# Patient Record
Sex: MALE | Race: WHITE | NOT HISPANIC OR LATINO | Employment: UNEMPLOYED | URBAN - METROPOLITAN AREA
[De-identification: names, ages, dates, MRNs, and addresses within clinical notes are randomized per-mention and may not be internally consistent; named-entity substitution may affect disease eponyms.]

---

## 2019-05-15 ENCOUNTER — TELEPHONE (OUTPATIENT)
Dept: VASCULAR SURGERY | Facility: CLINIC | Age: 49
End: 2019-05-15

## 2019-05-15 DIAGNOSIS — I87.2 VENOUS INSUFFICIENCY OF BOTH LOWER EXTREMITIES: Primary | ICD-10-CM

## 2019-05-15 NOTE — TELEPHONE ENCOUNTER
Per Dr. Sae daniels - pt. Scheduled for U/S Venous Insuff and apt with Dr. Quevedo. Information provide to patient. States understanding.

## 2019-05-21 ENCOUNTER — HOSPITAL ENCOUNTER (OUTPATIENT)
Dept: RADIOLOGY | Facility: OTHER | Age: 49
Discharge: HOME | End: 2019-05-21
Attending: SURGERY
Payer: MEDICAID

## 2019-05-21 DIAGNOSIS — I87.2 VENOUS INSUFFICIENCY OF BOTH LOWER EXTREMITIES: ICD-10-CM

## 2019-05-21 PROCEDURE — 93970 EXTREMITY STUDY: CPT | Mod: TC

## 2019-05-21 PROCEDURE — 93970 EXTREMITY STUDY: CPT | Mod: 26,,, | Performed by: INTERNAL MEDICINE

## 2019-05-21 PROCEDURE — 93970 US LOWER EXTREMITY VEINS BILATERAL INSUFFICIENCY: ICD-10-PCS | Mod: 26,,, | Performed by: INTERNAL MEDICINE

## 2019-05-24 ENCOUNTER — OFFICE VISIT (OUTPATIENT)
Dept: VASCULAR SURGERY | Facility: CLINIC | Age: 49
End: 2019-05-24
Payer: MEDICAID

## 2019-05-24 VITALS
HEIGHT: 71 IN | DIASTOLIC BLOOD PRESSURE: 77 MMHG | TEMPERATURE: 98 F | BODY MASS INDEX: 33.15 KG/M2 | WEIGHT: 236.75 LBS | SYSTOLIC BLOOD PRESSURE: 116 MMHG | HEART RATE: 92 BPM

## 2019-05-24 DIAGNOSIS — I87.2 VENOUS INSUFFICIENCY: Primary | ICD-10-CM

## 2019-05-24 PROCEDURE — 99204 OFFICE O/P NEW MOD 45 MIN: CPT | Mod: S$GLB,,, | Performed by: SURGERY

## 2019-05-24 PROCEDURE — 99204 PR OFFICE/OUTPT VISIT, NEW, LEVL IV, 45-59 MIN: ICD-10-PCS | Mod: S$GLB,,, | Performed by: SURGERY

## 2019-05-24 RX ORDER — NAPROXEN SODIUM 220 MG/1
81 TABLET, FILM COATED ORAL
Refills: 11 | COMMUNITY
Start: 2019-05-14

## 2019-05-24 RX ORDER — SELENIUM SULFIDE 22.5 MG/ML
SHAMPOO TOPICAL
Refills: 5 | COMMUNITY
Start: 2019-05-14 | End: 2024-02-15

## 2019-05-24 RX ORDER — CROFELEMER 125 MG/1
125 TABLET, COATED ORAL
Refills: 5 | COMMUNITY
Start: 2019-05-14

## 2019-05-24 RX ORDER — CLONAZEPAM 1 MG/1
1 TABLET ORAL
COMMUNITY
End: 2023-04-25

## 2019-05-24 NOTE — PROGRESS NOTES
Tolu Quevedo MD, RPVI                                 [facility]                           [facility]                             05/24/2019    HPI:  David Fam is a 49 y.o. male with   Patient Active Problem List   Diagnosis    HIV, symptomatic    Essential hypertension    Recurrent genital herpes    Cystic acne    Depression    being managed by PCP and specialists who is here today for evaluation of BLE edema.  Patient states location is bilateral calf and ankles occurring for 2 months.  Associated signs and symptoms include erythema and itching.  No pain.  Severity is 3/10.  Symptoms began 2 mo ago.  Alleviating factors include elevation.  Worsening factors include dependency.  Patient is not wearing compression stockings daily.  No FH of venous disease.  Symptoms do not limit patient's functional status and daily activities.  No DVT history.  No venous interventions.  + low sodium diet.  + excessive water intake.    Migraine with aura: no  PFO/ASD/right to left shunt: no  Pregnant: no  Breastfeeding: no    no MI  no Stroke  Tobacco use: 1 ppd     Past Medical History:   Diagnosis Date    Anal fissure     HIV infection     Hypertension      Past Surgical History:   Procedure Laterality Date    fissue       Family History   Problem Relation Age of Onset    Cancer Mother         breast ca    Hypertension Mother     Cancer Father         terminal cancer     Social History     Socioeconomic History    Marital status: Single     Spouse name: Not on file    Number of children: Not on file    Years of education: 12    Highest education level: Not on file   Occupational History    Not on file   Social Needs    Financial resource strain: Not on file    Food insecurity:     Worry: Not on file     Inability: Not on file    Transportation needs:     Medical: Not on file     Non-medical: Not on file   Tobacco Use    Smoking status: Current Every Day Smoker      Types: Pipe    Smokeless tobacco: Never Used   Substance and Sexual Activity    Alcohol use: Yes     Alcohol/week: 0.0 oz    Drug use: No    Sexual activity: Yes     Partners: Male   Lifestyle    Physical activity:     Days per week: Not on file     Minutes per session: Not on file    Stress: Not on file   Relationships    Social connections:     Talks on phone: Not on file     Gets together: Not on file     Attends Jewish service: Not on file     Active member of club or organization: Not on file     Attends meetings of clubs or organizations: Not on file     Relationship status: Not on file   Other Topics Concern    Not on file   Social History Narrative    Not on file       Current Outpatient Medications:     elviteg-cob-emtri-tenof ALAFEN (GENVOYA) [phone] mg Tab, , Disp: , Rfl:     loperamide (IMODIUM) 2 mg capsule, Take 1 capsule (2 mg total) by mouth 4 (four) times daily as needed for Diarrhea., Disp: 50 capsule, Rfl: 3    losartan-hydrochlorothiazide 100-12.5 mg (HYZAAR) 100-12.5 mg Tab, Take 1 tablet by mouth daily as needed., Disp: 30 tablet, Rfl: 5    acyclovir (ZOVIRAX) 400 MG tablet, Take 1 tablet (400 mg total) by mouth 2 (two) times daily., Disp: 60 tablet, Rfl: 5    aripiprazole (ABILIFY) 15 MG Tab, , Disp: , Rfl:     aspirin 81 MG Chew, 81 mg., Disp: , Rfl: 11    benzoyl peroxide (BREVOXYL) 4 % external liquid, Apply topically nightly. To chest and back, Disp: 1 Bottle, Rfl: 2    clonazePAM (KLONOPIN) 1 MG tablet, Take 1 mg by mouth., Disp: , Rfl:     desonide (DESOWEN) 0.05 % cream, Apply topically 2 (two) times daily. AAA BID x 2 weeks, then prn need only. Ok to mix with ketoconazole, Disp: 60 g, Rfl: 0    doxycycline (MONODOX) 100 MG capsule, Sig BID for two weeks, then once daily, Disp: 90 capsule, Rfl: 1    efavirenz-emtrictabine-tenofovir 600-200-300 mg (ATRIPLA) 600-200-300 mg Tab, Take 1 tablet by mouth every evening., Disp: 30 tablet, Rfl: 5    hydrOXYzine HCl  (ATARAX) 25 MG tablet, Take 25 mg by mouth 3 (three) times daily., Disp: , Rfl:     ketoconazole (NIZORAL) 2 % cream, Apply topically 2 (two) times daily. To rash on face BID in brow area, forehead, folds by nose. Ok to mix with desonide., Disp: 60 g, Rfl: 1    ketoconazole (NIZORAL) 2 % shampoo, Apply topically twice a week. Leave on five minutes before rinsing shampoo, Disp: 120 mL, Rfl: 2    multivitamin capsule, Take 1 capsule by mouth once daily., Disp: , Rfl:     mupirocin (BACTROBAN) 2 % ointment, Apply topically 3 (three) times daily., Disp: 30 g, Rfl: 5    MYTESI 125 mg TbEC, 125 mg., Disp: , Rfl: 5    nicotine (NICOTROL) 10 mg Crtg, Also dispense one dispenser, inhaler to use with crtg. Use once every hour prn, Disp: 168 each, Rfl: 4    olanzapine (ZYPREXA) 5 MG tablet, , Disp: , Rfl:     risperidone (RISPERDAL) 1 MG tablet, Take 1 mg by mouth 2 (two) times daily., Disp: , Rfl:     selenium sulfide 2.25 % Sham, , Disp: , Rfl: 5    sulfacetamide sodium-sulfur 10-5 % (w/w) Clsr, Wash face at least TIW, Disp: 360 g, Rfl: 3    REVIEW OF SYSTEMS:  General: No fevers or chills; ENT: No sore throat; Allergy and Immunology: no persistent infections; Hematological and Lymphatic: No history of bleeding or easy bruising; Endocrine: negative; Respiratory: no cough, shortness of breath, or wheezing; Cardiovascular: no chest pain or dyspnea on exertion; Gastrointestinal: no abdominal pain/back, change in bowel habits, or bloody stools; Genito-Urinary: no dysuria, trouble voiding, or hematuria; Musculoskeletal: edema; Neurological: no TIA or stroke symptoms; Psychiatric: no nervousness, anxiety or depression.    PHYSICAL EXAM:      Pulse: 92  Temp: 98.1 °F (36.7 °C)      General appearance:  Alert, well-appearing, and in no distress.  Oriented to person, place, and time                    Neurological: Normal speech, no focal findings noted; CN II - XII grossly intact. RLE with sensation to light touch, LLE  with sensation to light touch.            Musculoskeletal: Digits/nail without cyanosis/clubbing.  Strength 5/5 BLE.                    Neck: Supple, no significant adenopathy                  Chest:  No wheezes, symmetric air entry. No use of accessory muscles               Cardiac: Normal rate and regular rhythm            Abdomen: Soft, nontender, nondistended      Extremities:   2+ R DP pulse, 2+ L DP pulse      1+ RLE edema, 1+ LLE edema    Skin:  RLE no ulcer; LLE no ulcer      RLE no spider veins, LLE no spider veins      RLE no varicose veins, LLE no varicose veins    CEAP 3/3    LAB RESULTS:  No results found for: CBC  No results found for: LABPROT, INR  Lab Results   Component Value Date     05/05/2016    K 4.2 05/05/2016     05/05/2016    CO2 26 05/05/2016     05/05/2016    BUN 25 (H) 05/05/2016    CREATININE 0.9 05/05/2016    CALCIUM 9.5 05/05/2016    ANIONGAP 9 05/05/2016    EGFRNONAA >60.0 05/05/2016     Lab Results   Component Value Date    WBC 8.50 05/05/2016    RBC 5.20 05/05/2016    HGB 17.3 05/05/2016    HCT 50.9 05/05/2016    MCV 98 05/05/2016    MCH 33.3 (H) 05/05/2016    MCHC 34.1 05/05/2016    RDW 14.2 05/05/2016     (L) 05/05/2016    MPV 9.5 05/05/2016    GRAN 6.4 05/05/2016    GRAN 74.5 (H) 05/05/2016    LYMPH 1.6 05/05/2016    LYMPH 18.5 05/05/2016    MONO 0.5 05/05/2016    MONO 5.6 05/05/2016    EOS 0.1 05/05/2016    BASO 0.00 05/05/2016    EOSINOPHIL 1.0 05/05/2016    BASOPHIL 0.4 05/05/2016    DIFFMETHOD Automated 05/05/2016     .No results found for: HGBA1C    IMAGING:  All pertinent imaging has been reviewed and interpreted independently.    Venous US 5/15/19 Impression:  No evidence of venous thrombosis.  The left greater saphenous vein at the mid calf demonstrates a reflux time of 3032 milliseconds which is elevated and compatible with venous insufficiency.  The vessel at this level is not enlarged, measuring 3.3 mm.    IMP/PLAN:  49 y.o. male with   Patient  Active Problem List   Diagnosis    HIV, symptomatic    Essential hypertension    Recurrent genital herpes    Cystic acne    Depression    being managed by PCP and specialists who is here today for evaluation of BLE edema.    -recommend compression with Rx stockings, elevation, dietary changes associated with water and sodium intake discussed at length with patient  -Exercise   -RTC 3 months for further evaluation    I spent 20 minutes evaluating this patient and greater than 50% of the time was spent counseling, coordinator care and discussing the plan of care.  All questions were answered and patient stated understanding with agreement with the above treatment plan.    Tolu Quevedo MD VI  Vascular and Endovascular Surgery

## 2019-05-24 NOTE — LETTER
May 27, 2019      Brittney Orantes, NP  [id number]  [facility] [address]           [facility]  [address]  [facility] [address]  Phone: [phone]  Fax: [phone]          Patient: David Fam   MR Number: [id number]   YOB: 1970   Date of Visit: 5/24/2019       Dear Brittney Orantes:    Thank you for referring David Fam to me for evaluation. Attached you will find relevant portions of my assessment and plan of care.    If you have questions, please do not hesitate to call me. I look forward to following David Fam along with you.    Sincerely,    Tolu Quevedo MD    Enclosure  CC:  No Recipients    If you would like to receive this communication electronically, please contact [email or web address] or [phone] to request more information on [employer] Link access.    For providers and/or their staff who would like to refer a patient to [facility], please contact us through our one-stop-shop provider referral line, [facility] , at [phone].    If you feel you have received this communication in error or would no longer like to receive these types of communications, please e-mail [email or web address]

## 2019-05-24 NOTE — PATIENT INSTRUCTIONS
Putting on Compression Stockings     Turn the stocking inside-out, then fit it over your toes and heel.          Roll the stocking up your leg.            Once stockings are on, make sure the top of the stocking is about two fingers width below the crease of the knee (or the groin if you wear thigh-high stockings).          Use equipment, such as a stocking [place], or wear rubber gloves to make it easier to put on compression stockings.         Elastic compression stockings are prescribed to treat many vein problems. Wearing them may be the most important thing you do to manage your symptoms. The stockings fit tightly around your ankle, gradually reducing in pressure as they go up your legs. This helps keep blood flowing to your heart. As a result, swelling is reduced. Your healthcare provider will prescribe stockings at a safe pressure for you. He or she will also tell you how often to wear and remove the stockings. Follow these instructions closely. Also, do not buy or wear compression stockings without first seeing your healthcare provider.  Tips for wear and care  To wear stockings safely and to get the most benefit:  · Wear the length prescribed by your healthcare provider.  · Pull them to the designated height and no farther. Dont let them bunch at the top. This can restrict blood flow and increase swelling.  · Wear the stockings for the amount of time your healthcare provider recommends. Replace them when they start to feel loose. This will likely be every 3 to 6 months.  · Remove them as your healthcare provider directs. When removed, wash your legs. Then check your legs and feet for sores. Call your healthcare provider if you find a sore. Dont put the stockings back on unless your healthcare provider directs.   · Wash the stockings as instructed. They may need to be hand-washed.  Date Last Reviewed: 5/1/2016  © 0798-7598 [employer]. [address] [place] [address], [place] [address]. All rights  reserved. This information is not intended as a substitute for professional medical care. Always follow your healthcare professional's instructions.        Understanding Chronic Venous Insufficiency  Problems with the veins in the legs may lead to chronic venous insufficiency (CVI). CVI means that there is a long-term problem with the veins not being able to pump blood back to your heart. When this happens, blood stays in the legs and causes swelling and aching.   Two problems that may lead to chronic venous insufficiency are:  · Damaged valves. Valves keep blood flowing from the legs through the blood vessels and back to the heart. When the valves are damaged, blood does not flow as well.   · Deep vein thrombosis (DVT). Blood clots may form in the deep veins of the legs. This may cause pain, redness, and swelling in the legs. It may also block the flow of blood back to the heart. Seek immediate medical care if you have these symptoms.  · A blood clot in the leg can also break off and travel to the lungs. This is called pulmonary embolism (PE). In the lungs, the clot can cut off the flow of blood. This may cause chest pain, trouble breathing, sweating, a fast heartbeat, coughing (may cough up blood), and fainting. It is a medical emergency and may cause death. Call 911 if you have these symptoms.  · Healthcare providers call the two conditions, DVT and PE, venous thromboembolism (VTE).  CVI cant be cured, but you can control leg swelling to reduce the likelihood of ulcers (sores).  Recognizing the symptoms  Be aware of the following:  · If you stand or sit with your feet down for long periods, your legs may ache or feel heavy.  · Swollen ankles are possibly the most common symptom of CVI.  · As swelling increases, the skin over your ankles may show red spots or a brownish tinge. The skin may feel leathery or scaly, and may start to itch.  · If swelling is not controlled, an ulcer (open wound) may form.  What you  can do  Reduce your risk of developing ulcers by doing the following:  · Increase blood flow back to your heart by elevating your legs, exercising daily, and wearing elastic stockings.  · Boost blood flow in your legs by losing excess weight.  · If you must stand or sit in one place for a period of time, keep your blood moving by wiggling your toes, shifting your body position, and rising up on the balls of your feet.    Date Last Reviewed: 5/1/2016 © 2000-2017 [employer]. [address] [place] [address], [place] [address]. All rights reserved. This information is not intended as a substitute for professional medical care. Always follow your healthcare professional's instructions.        Tips for Using Less Salt    Most people with heart problems need to eat less salt (sodium). Reducing the amount of salt you eat may help control your blood pressure. The higher your blood pressure, the greater your risk for heart disease, stroke, blindness, and kidney problems.  At the store  · Make low-salt choices by reading labels carefully. Look for the total amount of sodium per serving.  · Use more fresh food. Buy more fruits and vegetables. Select lean meats, fish, and poultry.  · Use fewer frozen, canned, and packaged foods which often contain a lot of sodium.  · Use plain frozen vegetables without sauces or toppings. These products are often low- or no-sodium.  · Opt for reduced-sodium or no-salt-added versions of canned vegetables and soups.  In the kitchen  · Don't add salt to food when you're cooking. Season with flavorings such as onion, garlic, pepper, salt-free herbal blends, and lemon or lime juice.  · Use a cookbook containing low-salt recipes. It can give you ideas for tasty meals that are healthy for your heart.  · Sprinkle salt-free herbal blends on vegetables and meat.  · Drain and rinse canned foods, such as canned beans and vegetables, before cooking or eating.  Eating out  · Tell the  you're on a  low-salt diet. Ask questions about the menu.  · Order fish, chicken, and meat broiled, baked, poached, or grilled without salt, butter, or breading.  · Use lemon, pepper, and salt-free herb mixes to add flavor.  · Choose plain steamed rice, boiled noodles, and baked or boiled potatoes. Top potatoes with chives and a little sour cream.     Beware! Salt goes by many other names. Limit foods with these words listed as ingredients: salt, sodium, soy sauce, baking soda, baking powder, MSG, monosodium, Na (the chemical symbol for sodium). Some antacids are also high in salt.   Date Last Reviewed: 6/19/2015 © 2000-2017 [employer]. [address] [place] [address]. All rights reserved. This information is not intended as a substitute for professional medical care. Always follow your healthcare professional's instructions.        Low-Salt Diet  This diet removes foods that are high in salt. It also limits the amount of salt you use when cooking. It is most often used for people with high blood pressure, edema (fluid retention), and kidney, liver, or heart disease.  Table salt contains the mineral sodium. Your body needs sodium to work normally. But too much sodium can make your health problems worse. Your healthcare provider is recommending a low-salt (also called low-sodium) diet for you. Your total daily allowance of salt is 1,500 to 2,300 milligrams (mg). It is less than 1 teaspoon of table salt. This means you can have only about 500 to 700 mg of sodium at each meal. People with certain health problems should limit salt intake to the lower end of the recommended range.    When you cook, dont add much salt. If you can cook without using salt, even better. Dont add salt to your food at the table.  When shopping, read food labels. Salt is often called sodium on the label. Choose foods that are salt-free, low salt, or very low salt. Note that foods with reduced salt may not lower your salt intake  enough.    Beans, potatoes, and pasta  Ok: Dry beans, split peas, lentils, potatoes, rice, macaroni, pasta, spaghetti without added salt  Avoid: Potato chips, tortilla chips, and similar products  Breads and cereals  Ok: Low-sodium breads, rolls, cereals, and cakes; low-salt crackers, matzo crackers  Avoid: Salted crackers, pretzels, popcorn, Lao toast, pancakes, muffins  Dairy  Ok: Milk, chocolate milk, hot chocolate mix, low-salt cheeses, and yogurt  Avoid: Processed cheese and cheese spreads; Roquefort, Camembert, and cottage cheese; buttermilk, instant breakfast drink  Desserts  Ok: Ice cream, frozen yogurt, juice bars, gelatin, cookies and pies, sugar, honey, jelly, hard candy  Avoid: Most pies, cakes and cookies prepared or processed with salt; instant pudding  Drinks  Ok: Tea, coffee, fizzy (carbonated) drinks, juices  Avoid: Flavored coffees, electrolyte replacement drinks, sports drinks  Meats  Ok: All fresh meat, fish, poultry, low-salt tuna, eggs, egg substitute  Avoid: Smoked, pickled, brine-cured, or salted meats and fish. This includes germain, chipped beef, corned beef, hot dogs, deli meats, ham, kosher meats, salt pork, sausage, canned tuna, salted codfish, smoked salmon, herring, sardines, or anchovies.  Seasonings and spices  Ok: Most seasonings are okay. Good substitutes for salt include: fresh herb blends, hot sauce, lemon, garlic, lam, vinegar, dry mustard, parsley, cilantro, horseradish, tomato paste, regular margarine, mayonnaise, unsalted butter, cream cheese, vegetable oil, cream, low-salt salad dressing and gravy.  Avoid: Regular ketchup, relishes, pickles, soy sauce, teriyaki sauce, Worcestershire sauce, BBQ sauce, tartar sauce, meat tenderizer, chili sauce, regular gravy, regular salad dressing, salted butter  Soups  Ok: Low-salt soups and broths made with allowed foods  Avoid: Bouillon cubes, soups with smoked or salted meats, regular soup and broth  Vegetables  Ok: Most vegetables  are okay; also low-salt tomato and vegetable juices  Avoid: Sauerkraut and other brine-soaked vegetables; pickles and other pickled vegetables; tomato juice, olives  Date Last Reviewed: 8/1/2016 © 2000-2017 [employer]. [address] [place] [address]. All rights reserved. This information is not intended as a substitute for professional medical care. Always follow your healthcare professional's instructions.        Low-Salt Choices  Eating salt (sodium) can make your body retain too much water. Excess water makes your heart work harder. Canned, packaged, and frozen foods are easy to prepare, but they are often high in sodium. Here are some ideas for low-salt foods you can easily prepare yourself.    For breakfast  · Fruit or 100% fruit juice  · Whole-wheat bread or an English muffin. Compare sodium content on labels.  · Low-fat milk or yogurt  · Unsalted eggs  · Shredded wheat  · Corn tortillas  · Unsalted steamed rice  · Regular (not instant) hot cereal, made without salt  Stay away from:  · Sausage, germain, and ham  · Flour tortillas  · Packaged muffins, pancakes, and biscuits  · Instant hot cereals  · Cottage cheese  For lunch and dinner  · Fresh fish, chicken, turkey, or meat--baked, broiled, or roasted without salt  · Dry beans, cooked without salt  · Tofu, stir-fried without salt  · Unsalted fresh fruit and vegetables, or frozen or canned fruit and vegetables with no added salt  Stay away from:  · Lunch or deli meat that is cured or smoked  · Cheese  · Tomato juice and catsup  · Canned vegetables, soups, and fish not labeled as no-salt-added or reduced sodium  · Packaged gravies and sauces  · Olives, pickles, and relish  · Bottled salad dressings  For snacks and desserts  · Yogurt  · Unsalted, air popped popcorn  · Unsalted nuts or seeds  Stay away from:  · Pies and cakes  · Packaged dessert mixes  · Pizza  · Canned and packaged puddings  · Pretzels, chips, crackers, and nuts--unless  the label says unsalted  Date Last Reviewed: 6/17/2015  © 2206-6677 [employer]. [address] [place] [address], [place] [address]. All rights reserved. This information is not intended as a substitute for professional medical care. Always follow your healthcare professional's instructions.

## 2019-11-12 DIAGNOSIS — S61.219A CUT OF FINGER: Primary | ICD-10-CM

## 2019-11-18 DIAGNOSIS — S61.316A: Primary | ICD-10-CM

## 2019-12-03 DIAGNOSIS — S61.316A: Primary | ICD-10-CM

## 2019-12-10 ENCOUNTER — CLINICAL SUPPORT (OUTPATIENT)
Dept: REHABILITATION | Facility: HOSPITAL | Age: 49
End: 2019-12-10
Payer: MEDICAID

## 2019-12-10 DIAGNOSIS — S61.316A LACERATION OF RIGHT LITTLE FINGER WITHOUT FOREIGN BODY WITH DAMAGE TO NAIL, INITIAL ENCOUNTER: Primary | ICD-10-CM

## 2019-12-10 PROCEDURE — 97110 THERAPEUTIC EXERCISES: CPT

## 2019-12-10 PROCEDURE — 97035 APP MDLTY 1+ULTRASOUND EA 15: CPT

## 2019-12-10 PROCEDURE — 97165 OT EVAL LOW COMPLEX 30 MIN: CPT

## 2019-12-10 NOTE — PLAN OF CARE
[facility] Occupational Therapy  Initial Evaluation     Name: David Fam  Clinic Number: [id number]    Therapy Diagnosis:  Right small finger Flexor tendon repair 11/7/19   Encounter Diagnosis   Name Primary?    Laceration of right little finger without foreign body with damage to nail, initial encounter Yes     Physician: Chris Roberts III*    Physician Orders: orders from 11/15/19  eval and treat ,  PROM and place and hold til next follow visit with MD 12/13/19   Medical Diagnosis: S61.316A (ICD-10-CM) - Laceration of right little finger without foreign body with damage to nail     Surgical Procedure/ Date :11/7/19 had flexor tendon repair right small finger   Evaluation Date: 12/10/2019  Insurance:medicaid   Insurance Authorization period Expiration: 12/31/19   Plan of Care Certification Period: 2/1/20    Visit # / Visits Authortized: 1 / 1 per computer   Time In:4:10  Time Out: 5:00  Total Billable Time: 50 minutes      **Precautions: HIV precautions     Subjective    Pt reports that he never came to therapy after his surgery , he was scheduled by hand surgical central scheduling  For an [facility] appt and went to  [facility] to only find out that he should have been at [facility] and no one could see him  Today 12/10/19,  4 weeks post op is his first day in therapy. He reports that he wore the plaster splint they gave him for 3 weeks. Since then he has had nothing on his hand.    He last saw the MD 11/15/19. The doctor does not know that he never started any therapy til today. His next follow up MD appt is 12/13/19 .     Involved Side:  Right small finger   Dominant Side: Right  Date of Onset: 11/7/19 surgery   Mechanism of Injury:  Cut his right small finger with a knife.   History of Current Condition: had flexor tendon surgery on 11/7/19, never did any formal therapy til today 12/13/19, he stayed in post op plaster splint he was issued for 3 weeks and now he he does not have any  motion, he states his finger is stuck just like it was in the splint . Pt reports that he has a hard mass of tissue over incision, he realizes it is very scarred down.      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     Current FunctionalStatus   Home/Living environment : lives with their family      Limitation of Functional Status as follows:   ADLs/IADLs:     - Feeding:Not Necessary    - Bathing:Not Necessary    - Dressing/Grooming: Not Necessary    - Driving: Minimal-Moderate Modifications/Assistance     Leisure:  Unknown       Past Medical History/Physical Systems Review:   David Fam  has a past medical history of Anal fissure, HIV infection, and Hypertension.    David Fam  has a past surgical history that includes fissue.    David has a current medication list which includes the following prescription(s): acyclovir, aripiprazole, aspirin, benzoyl peroxide, clonazepam, desonide, doxycycline, efavirenz-emtrictabine-tenofovir 600-200-300 mg, elviteg-cob-emtri-tenof alafen, hydroxyzine hcl, ketoconazole, ketoconazole, loperamide, losartan-hydrochlorothiazide 100-12.5 mg, multivitamin, mupirocin, mytesi, nicotine, olanzapine, risperidone, selenium sulfide, and sulfacetamide sodium-sulfur.    Review of patient's allergies indicates:  No Known Allergies       Objective     Observation/Appearance:  Skin intact, Skin dry and Joint contracture PIP jt.     Hand ROM. Measured in degrees.   12/10/2019 12/10/19       Right Right                     AROM  PROM     Small:  MP -15/30 70flexion                 PIP 55/55 70 flexion                 DIP 30/30 60 flexion                 BOWERS 15              Sensation  Ulnar Nerve  Distribution 12/10/2019 12/10/2019    Left Right   [place]     Normal 1.65-2.83     Diminished Light Touch 3.22-3.61     Diminished Protective 3.84-4.31  X   Loss of Protective 4.56-6.65     Untestable >6.65     2 Point Discrimination     Static     Dynamic        Strength (Dyanmometer) and Pinch Strength (Pinch Gauge)  Measured in pounds and psi. Average of three trials. Not tested       CMS Impairment/Limitation/Restriction for FOTO initial  Survey    Therapist reviewed FOTO scores for David Fam on 12/10/2019.   FOTO documents entered into [employer] - see Media section.                    12/10/2019     Category: Self care         Current : CJ = at least 20% but < 40% impaired, limited or restricted  Goal: CJ = at least 20% but < 40% impaired, limited or restricted  Discharge:          Treatment     Treatment Time In: 4:15  Treatment Time Out: 5:00  Total Treatment time separate from Evaluation time:55        David received the following supervised modalities after being cleared for contradictions for 10 minutes:   -Moist heat to right hand     David received the following direct contact modalities after being cleared for contraindications for 6 minutes:  -1.o w/cm2 X 6 minutes ,  continuous 100 duty cycle     David received the following manual therapy techniques for 10 minutes:   -STM over incision     David received therapeutic exercises for 20 minutes including:  -Place and hold in fisting position 15 reps   Passive ROM flexion 10 reps   Active ROM fisting 15 reps     Discussed with patient that he is scarred down due to not starting therapy til now 4.5 weeks since surgery.  Emphasized the importance of attending therapy and working hard to possibly get tendons to move. Discussed options later that he may need a second surgery to  remove scar tissue. Pt verbalized full understanding of this.        Home Exercise Program/Education:  Issued HEP:   Passive flexion 20 reps 3 times a day  Place and hold  Fisting with 10 second hold each rep , 20 reps 3 times a day   Active ROM fisting 20 reps, 3 times day    Scar massage 2 times a day          . Exercises were reviewed and David was able to demonstrate them prior to the end of the session.   Pt received a written copy of exercises to perform at home. David demonstrated fair  understanding of the education provided.  Pt was advised to perform these exercises free of pain, and to stop performing them if pain occurs.    Patient/Family Education: role of OT, goals for OT, scheduling/cancellations - pt verbalized understanding. Discussed insurance limitations with patient.        Assessment     David Fam is a 49 y.o. male referred to outpatient occupational/hand therapy and presents with a medical diagnosis of  Right small finger flexor tendon repair 11/7/19 and not starting therapy for 4.5 weeks has, resulted in significant impairments of tendon gliding and Active ROM to right small finger, with excessive scarring over incision.  and demonstrates limitations as described in the chart below. Following  medical record review it is determined that pt will benefit from occupational therapy services in order to maximize pain free and/or functional use of right hand .     The patient's rehab potential is Fair due to scarring of tendons .     Anticipated barriers to occupational therapy: none   Pt has no cultural, educational or language barriers to learning provided.    Profile and History Assessment of Occupational Performance Level of Clinical Decision Making Complexity Score   Occupational Profile:   David Fam is a 49 y.o. male who lives with their family and is currently employed . David Fam has difficulty with  Dressing, fine motor to manage clothing   housework/household chores  affecting  his/her daily functional abilities. His/her main goal for therapy is typing for work .     Comorbidities:    has a past medical history of Anal fissure, HIV infection, and Hypertension.    Medical and Therapy History Review:   Brief               Performance Deficits    Physical:  Joint Mobility   Strength  Pinch Strength  Fine Motor Coordination    Cognitive:  No Deficits    Psychosocial:    No Deficits     Clinical Decision Making:  low    Assessment Process:  Problem-Focused Assessments    Modification/Need for Assistance:  Not Necessary    Intervention Selection:  Limited Treatment Options       low  Based on PMHX, co morbidities , data from assessments and functional level of assistance required with task and clinical presentation directly impacting function.       The following goals were discussed with the patient and patient is in agreement with them as to be addressed in the treatment plan.          GOALS: 6 weeks. Pt agrees with goals set.      Short Term (6 weeks on 1/30/20  1)   Patient to be IND with HEP and modalities for pain management, progressing   2)   Increase ROM 15 degrees  For right small finger motion to increase functional hand use for  Right hand , progressing       Long Term (by discharge):  1)   Pt will report 0 out of 10 pain with right hand ., progressing   2)   Patient to score of CJ  on FOTO to demonstrate improved perception of functionalright hand/ arm  Use. Progressing   3)   Pt will return to prior level of function for ADLs and household management, progressing              Plan   Certification Period/Plan of care expiration: 12/10/2019 to  1/30/2020.    Outpatient Occupational Therapy 2 times weekly for 6 weeks may include the following interventions: Paraffin, Fluidotherapy, Modalities for pain management, Therapeutic exercises/activities. and Strengthening.      Nita Blanco, OT

## 2019-12-17 ENCOUNTER — CLINICAL SUPPORT (OUTPATIENT)
Dept: REHABILITATION | Facility: HOSPITAL | Age: 49
End: 2019-12-17
Payer: MEDICAID

## 2019-12-17 DIAGNOSIS — S61.316A LACERATION OF RIGHT LITTLE FINGER WITHOUT FOREIGN BODY WITH DAMAGE TO NAIL, INITIAL ENCOUNTER: Primary | ICD-10-CM

## 2019-12-17 PROCEDURE — L3921 HFO W/JOINT(S) CF: HCPCS

## 2019-12-17 PROCEDURE — 97035 APP MDLTY 1+ULTRASOUND EA 15: CPT

## 2019-12-17 PROCEDURE — 97110 THERAPEUTIC EXERCISES: CPT

## 2019-12-18 ENCOUNTER — CLINICAL SUPPORT (OUTPATIENT)
Dept: REHABILITATION | Facility: HOSPITAL | Age: 49
End: 2019-12-18
Payer: MEDICAID

## 2019-12-18 DIAGNOSIS — S61.316A LACERATION OF RIGHT LITTLE FINGER WITHOUT FOREIGN BODY WITH DAMAGE TO NAIL, INITIAL ENCOUNTER: Primary | ICD-10-CM

## 2019-12-18 PROCEDURE — 97110 THERAPEUTIC EXERCISES: CPT

## 2019-12-18 NOTE — PROGRESS NOTES
Occupational Therapy Daily Treatment Note   Date 12/18/2019  Name: David Fam  Clinic Number: [id number]     Therapy Diagnosis:  Right small finger Flexor tendon repair 11/7/19        Encounter Diagnosis   Name Primary?    Laceration of right little finger without foreign body with damage to nail, initial encounter Yes      Physician: Chris Daly III*     Physician Orders: orders from 11/15/19  eval and treat ,  PROM and place and hold til next follow visit with MD 12/13/19   Medical Diagnosis: S61.316A (ICD-10-CM) - Laceration of right little finger without foreign body with damage to nail      Surgical Procedure/ Date :11/7/19 had flexor tendon repair right small finger   Evaluation Date: 12/10/2019  Insurance:medicaid   Insurance Authorization period Expiration: 12/31/19   Plan of Care Certification Period: 2/1/20     Visit # / Visits Authortized: 1 / 1 per computer   Time In:4:10  Time Out: 5:00  Total Billable Time: 50 minutes     Pt is s/p 6 weeks today 12/17/19      Precautions: HIV precautions       Subjective   Pt states that he saw Dr daly and he dicussed that he is scarred down and in 6  Months he may need a tendonlysis .   Pt reports: he was compliant with home exercise program given last session.   Response to previous treatment: slight increase flexion at mp joints   Functional change:  No functional change noted     Pain: 2/10  Location: right fingers      Objective   David received the following supervised modalities after being cleared for contradictions for 10 minutes:   -Moist heat to right hand      David received the following direct contact modalities after being cleared for contraindications for 6 minutes:  -1.o w/cm2 X 6 minutes ,  continuous 100 duty cycle      David received the following manual therapy techniques for 10 minutes:   -STM over incision      David received therapeutic exercises for 20 minutes including:  -Place and hold in fisting position  15 reps   Passive ROM flexion 10 reps   Active ROM fisting 15 reps    gentle  joint blocking 15 reps pip flexion     Fabricated a volar based hand splint for positioning to attempt to get increased PIP extension  Of right small finger  To wear only at night at this time       Discussed with patient that he is scarred down due to not starting therapy til now 4.5 weeks since surgery.  Emphasized the importance of attending therapy and working hard to possibly get tendons to move. Discussed options later that he may need a second surgery to remove scar tissue. Pt verbalized full understanding of this.         Home Exercises and Education Provided     Education provided:     - Progress towards goals     Written Home Exercises Provided: Patient instructed to cont prior HEP. Tendon glides and  Joint blocking   Exercises were reviewed and David was able to demonstrate them prior to the end of the session.  David demonstrated  Fair understanding of the education provided.   .     Assessment     Pt would continue to benefit from skilled OT.  Yes      David is progressing well towards his goals and there are no updates to goals at this time. Pt prognosis is Fair.     Pt will continue to benefit from skilled outpatient occupational therapy to address the deficits listed in the problem list on initial evaluation provide pt/family education and to maximize pt's level of independence in the home and community environment.     Anticipated barriers to occupational therapy: increased scarring  Due to not starting therapy til 5 weeks post op flexor tendon repair.     Pt's spiritual, cultural and educational needs considered and pt agreeable to plan of care and goals.      GOALS: 6 weeks. Pt agrees with goals set.        Short Term (6 weeks on 1/30/20  1)   Patient to be IND with HEP and modalities for pain management, progressing   2)   Increase ROM 15 degrees  For right small finger motion to increase functional hand use for  Right  hand , progressing         Long Term (by discharge):  1)   Pt will report 0 out of 10 pain with right hand ., progressing   2)   Patient to score of CJ  on FOTO to demonstrate improved perception of functionalright hand/ arm  Use. Progressing   3)   Pt will return to prior level of function for ADLs and household management, progressing          Plan     Discussed Plan of Care with patient: Yes  Updates/Grading for next session: continue       Nita Blanco, OT

## 2019-12-18 NOTE — PROGRESS NOTES
Occupational Therapy Daily Treatment Note   Date 12/17/2019  Name: David Fam  Clinic Number: [id number]     Therapy Diagnosis:  Right small finger Flexor tendon repair 11/7/19        Encounter Diagnosis   Name Primary?    Laceration of right little finger without foreign body with damage to nail, initial encounter Yes      Physician: Chris Daly III*     Physician Orders: orders from 11/15/19  eval and treat ,  PROM and place and hold til next follow visit with MD 12/13/19   Medical Diagnosis: S61.316A (ICD-10-CM) - Laceration of right little finger without foreign body with damage to nail      Surgical Procedure/ Date :11/7/19 had flexor tendon repair right small finger   Evaluation Date: 12/10/2019  Insurance:medicaid   Insurance Authorization period Expiration: 12/31/19   Plan of Care Certification Period: 2/1/20     Visit # / Visits Authortized: 1 / 1 per computer   Time In:4:10  Time Out: 5:00  Total Billable Time: 50 minutes     Pt is s/p 6 weeks today 12/17/19      Precautions: HIV precautions       Subjective   Pt states that he saw Dr daly and he dicussed that he is scarred down and in 6  Months he may need a tendonlysis .   Pt reports: he was compliant with home exercise program given last session.   Response to previous treatment: slight increase flexion at mp joints   Functional change:  No functional change noted     Pain: 2/10  Location: right fingers      Objective   David received the following supervised modalities after being cleared for contradictions for 10 minutes:   -Moist heat to right hand      David received the following direct contact modalities after being cleared for contraindications for 6 minutes:  -1.o w/cm2 X 6 minutes ,  continuous 100 duty cycle      David received the following manual therapy techniques for 10 minutes:   -STM over incision      David received therapeutic exercises for 20 minutes including:  -Place and hold in fisting position  15 reps   Passive ROM flexion 10 reps   Active ROM fisting 15 reps    gentle  joint blocking 15 reps pip flexion     Fabricated a volar based hand splint for positioning to attempt to get increased PIP extension  Of right small finger  To wear only at night at this time       Discussed with patient that he is scarred down due to not starting therapy til now 4.5 weeks since surgery.  Emphasized the importance of attending therapy and working hard to possibly get tendons to move. Discussed options later that he may need a second surgery to remove scar tissue. Pt verbalized full understanding of this.         Home Exercises and Education Provided     Education provided:     - Progress towards goals     Written Home Exercises Provided: Patient instructed to cont prior HEP. Tendon glides and  Joint blocking   Exercises were reviewed and David was able to demonstrate them prior to the end of the session.  David demonstrated  Fair understanding of the education provided.   .     Assessment     Pt would continue to benefit from skilled OT.  Yes      David is progressing well towards his goals and there are no updates to goals at this time. Pt prognosis is Fair.     Pt will continue to benefit from skilled outpatient occupational therapy to address the deficits listed in the problem list on initial evaluation provide pt/family education and to maximize pt's level of independence in the home and community environment.     Anticipated barriers to occupational therapy: increased scarring  Due to not starting therapy til 5 weeks post op flexor tendon repair.     Pt's spiritual, cultural and educational needs considered and pt agreeable to plan of care and goals.      GOALS: 6 weeks. Pt agrees with goals set.        Short Term (6 weeks on 1/30/20  1)   Patient to be IND with HEP and modalities for pain management, progressing   2)   Increase ROM 15 degrees  For right small finger motion to increase functional hand use for  Right  hand , progressing         Long Term (by discharge):  1)   Pt will report 0 out of 10 pain with right hand ., progressing   2)   Patient to score of CJ  on FOTO to demonstrate improved perception of functionalright hand/ arm  Use. Progressing   3)   Pt will return to prior level of function for ADLs and household management, progressing          Plan     Discussed Plan of Care with patient: Yes  Updates/Grading for next session: continue       Nita Blanco, OT

## 2019-12-24 ENCOUNTER — CLINICAL SUPPORT (OUTPATIENT)
Dept: REHABILITATION | Facility: HOSPITAL | Age: 49
End: 2019-12-24
Payer: MEDICAID

## 2019-12-24 DIAGNOSIS — S61.316A LACERATION OF RIGHT LITTLE FINGER WITHOUT FOREIGN BODY WITH DAMAGE TO NAIL, INITIAL ENCOUNTER: Primary | ICD-10-CM

## 2019-12-24 PROCEDURE — 97110 THERAPEUTIC EXERCISES: CPT

## 2019-12-24 PROCEDURE — 97035 APP MDLTY 1+ULTRASOUND EA 15: CPT

## 2019-12-24 NOTE — PROGRESS NOTES
Occupational Therapy Daily Treatment Note   Date 12/24/2019  Name: David Fam  Clinic Number: [id number]     Therapy Diagnosis:  Right small finger Flexor tendon repair 11/7/19        Encounter Diagnosis   Name Primary?    Laceration of right little finger without foreign body with damage to nail, initial encounter Yes      Physician: Chris Daly     Physician Orders: orders from 11/15/19  eval and treat ,      Medical Diagnosis: S61.316A (ICD-10-CM) - Laceration of right little finger without foreign body with damage to nail      Surgical Procedure/ Date :11/7/19 had flexor tendon repair right small finger   Evaluation Date: 12/10/2019  Insurance:medicaid   Insurance Authorization period Expiration: 12/31/19   Plan of Care Certification Period: 2/1/20     Visit # / Visits Authortized: 1 / 1 per computer   Time In:4:10  Time Out: 5:00  Total Billable Time: 50 minutes     Pt is s/p 7 weeks today 12/24/19      Precautions: HIV precautions       Subjective   Pt states that he saw Dr daly and he dicussed that he is scarred down and in 6  Months he may need a tendonlysis . Pt reports that splint is still helping with motion .     Pt reports: he was compliant with home exercise program given last session.  He thinks that he might  Have a little more motion .     Response to previous treatment: slight increase flexion at mp joints   Functional change:  No functional change noted     Pain: 2/10  Location: right fingers      Objective   David received the following supervised modalities after being cleared for contradictions for 10 minutes:   -Moist heat to right hand      David received the following direct contact modalities after being cleared for contraindications for 6 minutes:  -1.o w/cm2 X 6 minutes ,  continuous 100 duty cycle      David received the following manual therapy techniques for 10 minutes:   -STM over incision      David received therapeutic exercises for 20  minutes including:  -Place and hold in fisting position 15 reps   Passive ROM flexion 10 reps   Active ROM fisting 15 reps    joint blocking 15 reps pip flexion  And DIP      towel walking 2 minutes   Little finger and thumb pinch to  buttons and pom poms 2 trials each       volar based hand splint for positioning to attempt to get increased PIP extension  Of right small finger  To wear only at night at this time       Discussed with patient that he is scarred down due to not starting therapy til now 4.5 weeks since surgery.  Emphasized the importance of attending therapy and working hard to possibly get tendons to move. Discussed options later that he may need a second surgery to remove scar tissue. Pt verbalized full understanding of this.         Home Exercises and Education Provided     Education provided:     - Progress towards goals     Written Home Exercises Provided: Patient instructed to cont prior HEP. Tendon glides and  Joint blocking   Exercises were reviewed and David was able to demonstrate them prior to the end of the session.  David demonstrated  Fair understanding of the education provided.   .     Assessment     Pt would continue to benefit from skilled OT.  Yes      David is progressing well towards his goals and there are no updates to goals at this time. Pt prognosis is Fair.     Pt will continue to benefit from skilled outpatient occupational therapy to address the deficits listed in the problem list on initial evaluation provide pt/family education and to maximize pt's level of independence in the home and community environment.     Anticipated barriers to occupational therapy: increased scarring  Due to not starting therapy til 5 weeks post op flexor tendon repair.     Pt's spiritual, cultural and educational needs considered and pt agreeable to plan of care and goals.      GOALS: 6 weeks. Pt agrees with goals set.        Short Term (6 weeks on 1/30/20  1)   Patient to be IND with HEP  and modalities for pain management, progressing   2)   Increase ROM 15 degrees  For right small finger motion to increase functional hand use for  Right hand , progressing         Long Term (by discharge):  1)   Pt will report 0 out of 10 pain with right hand ., progressing   2)   Patient to score of CJ  on FOTO to demonstrate improved perception of functionalright hand/ arm  Use. Progressing   3)   Pt will return to prior level of function for ADLs and household management, progressing          Plan     Discussed Plan of Care with patient: Yes  Updates/Grading for next session: continue       Nita Blanco, OT

## 2019-12-31 ENCOUNTER — CLINICAL SUPPORT (OUTPATIENT)
Dept: REHABILITATION | Facility: HOSPITAL | Age: 49
End: 2019-12-31
Payer: MEDICAID

## 2019-12-31 DIAGNOSIS — S61.316A LACERATION OF RIGHT LITTLE FINGER WITHOUT FOREIGN BODY WITH DAMAGE TO NAIL, INITIAL ENCOUNTER: Primary | ICD-10-CM

## 2019-12-31 PROCEDURE — 97140 MANUAL THERAPY 1/> REGIONS: CPT

## 2019-12-31 PROCEDURE — 97035 APP MDLTY 1+ULTRASOUND EA 15: CPT

## 2019-12-31 PROCEDURE — 97110 THERAPEUTIC EXERCISES: CPT

## 2019-12-31 NOTE — PROGRESS NOTES
Occupational Therapy Daily Treatment Note   Date 12/31/2019  Name: David Fam  Clinic Number: [id number]     Therapy Diagnosis:  Right small finger Flexor tendon repair 11/7/19        Encounter Diagnosis   Name Primary?    Laceration of right little finger without foreign body with damage to nail, initial encounter Yes      Physician: Chris Daly III*     Physician Orders: orders from 11/15/19  eval and treat ,      Medical Diagnosis: S61.316A (ICD-10-CM) - Laceration of right little finger without foreign body with damage to nail      Surgical Procedure/ Date :11/7/19 had flexor tendon repair right small finger   Evaluation Date: 12/10/2019  Insurance:medicaid   Insurance Authorization period Expiration: 12/31/19   Plan of Care Certification Period: 2/1/20     Visit # / Visits Authortized: 4/12  Time In:12:05 pm  Time Out: 1:00 pm  Total Billable Time: 35 minutes     Pt is s/p 8 weeks today 12/31/19      Precautions: HIV precautions       Subjective   Pt states that he saw Dr daly and he dicussed that he is scarred down and in 6  Months he may need a tendonlysis . Pt reports that splint is still helping with motion .     Pt reports: he was compliant with home exercise program given last session.  He thinks that he might  Have a little more motion .     Response to previous treatment: slight increase flexion at mp joints   Functional change:  No functional change noted     Pain: 2/10  Location: right fingers      Objective   David received the following supervised modalities after being cleared for contradictions for 10 minutes:   -Moist heat to right hand      David received the following direct contact modalities after being cleared for contraindications for 6 minutes:  - U/S  3 Mhz @ 1.o w/cm2 X 6 minutes ,  continuous 100 duty cycle      David received the following manual therapy techniques for 10 minutes:   -STM over scar  -mini-massager over scar     David received  therapeutic exercises for 25 minutes including:  -Place and hold in fisting position 15 reps   Passive ROM flexion 10 reps   Active ROM fisting 15 reps    joint blocking 15 reps pip flexion  And DIP   TGEx x 10 reps  Reverse PIP kicks x 10 reps     towel walking 2 minutes   Little finger and thumb pinch to  buttons and pom poms 2 trials each   Yellow putty: 2' molding, 30 grasps, 30 scrapes      volar based hand splint for positioning to attempt to get increased PIP extension  Of right small finger  To wear only at night at this time       Discussed with patient that he is scarred down due to not starting therapy til now 4.5 weeks since surgery.  Emphasized the importance of attending therapy and working hard to possibly get tendons to move. Discussed options later that he may need a second surgery to remove scar tissue. Pt verbalized full understanding of this.         Home Exercises and Education Provided     Education provided:   - Pt instructed to increase his HEP to 5-6 x daily for ROM activities, yellow putty 1 x daily  - Progress towards goals: Fair     Written Home Exercises Provided: Patient instructed to cont prior HEP. Tendon glides and  Joint blocking   Exercises were reviewed and David was able to demonstrate them prior to the end of the session.  David demonstrated  Fair understanding of the education provided.   .     Assessment   Pt tolerated all activities with no difficulty. Advanced to yellow putty with no difficulty.    Pt would continue to benefit from skilled OT.  Yes      David is progressing well towards his goals and there are no updates to goals at this time. Pt prognosis is Fair.     Pt will continue to benefit from skilled outpatient occupational therapy to address the deficits listed in the problem list on initial evaluation provide pt/family education and to maximize pt's level of independence in the home and community environment.     Anticipated barriers to occupational  therapy: increased scarring  Due to not starting therapy til 5 weeks post op flexor tendon repair.     Pt's spiritual, cultural and educational needs considered and pt agreeable to plan of care and goals.      GOALS: 6 weeks. Pt agrees with goals set.        Short Term (6 weeks on 1/30/20  1)   Patient to be IND with HEP and modalities for pain management, progressing   2)   Increase ROM 15 degrees  For right small finger motion to increase functional hand use for  Right hand , progressing         Long Term (by discharge):  1)   Pt will report 0 out of 10 pain with right hand ., progressing   2)   Patient to score of CJ  on FOTO to demonstrate improved perception of functionalright hand/ arm  Use. Progressing   3)   Pt will return to prior level of function for ADLs and household management, progressing          Plan     Discussed Plan of Care with patient: Yes  Updates/Grading for next session: continue       Yolanda Pena, OT

## 2019-12-31 NOTE — PATIENT INSTRUCTIONS
[facility]  OCCUPATIONAL THERAPY  HOME EXERCISE PROGRAM     Complete the following strengthening program 1x/day.                   _                        ROBERTO Molina, KARIS  Certified Hand Therapist  Occupational Therapist

## 2020-01-09 ENCOUNTER — CLINICAL SUPPORT (OUTPATIENT)
Dept: REHABILITATION | Facility: HOSPITAL | Age: 50
End: 2020-01-09
Payer: MEDICAID

## 2020-01-09 DIAGNOSIS — S61.316A LACERATION OF RIGHT LITTLE FINGER WITHOUT FOREIGN BODY WITH DAMAGE TO NAIL, INITIAL ENCOUNTER: Primary | ICD-10-CM

## 2020-01-09 PROCEDURE — 97022 WHIRLPOOL THERAPY: CPT

## 2020-01-09 PROCEDURE — 97110 THERAPEUTIC EXERCISES: CPT

## 2020-01-09 NOTE — PROGRESS NOTES
Occupational Therapy Daily Treatment Note   Date 1/9/2020  Name: David Fam  Clinic Number: [id number]     Therapy Diagnosis:  Right small finger Flexor tendon repair 11/7/19        Encounter Diagnosis   Name Primary?    Laceration of right little finger without foreign body with damage to nail, initial encounter Yes      Physician: Chris Daly III*     Physician Orders: orders from 11/15/19  eval and treat ,      Medical Diagnosis: S61.316A (ICD-10-CM) - Laceration of right little finger without foreign body with damage to nail      Surgical Procedure/ Date :11/7/19 had flexor tendon repair right small finger   Evaluation Date: 12/10/2019  Insurance:medicaid   Insurance Authorization period Expiration: 12/31/19   Plan of Care Certification Period: 2/1/20     Visit # / Visits Authortized: 5/ /12  Time In:2:00  pm  Time Out: 3:00 pm  Total Billable Time: 50 minutes     Pt is s/p 8 weeks today 12/31/19      Precautions: HIV precautions       Subjective   Pt states that he saw Dr daly and he dicussed that he is scarred down and in 6  Months he may need a tendonlysis . Pt reports that splint is still helping with motion .     Pt reports: he was compliant with home exercise program given last session.  He thinks that he might  Have a little more motion .     Response to previous treatment: slight increase flexion at mp joints   Functional change:  No functional change noted     Pain: 2/10  Location: right fingers      Objective   David received the following supervised modalities after being cleared for contradictions for 10 minutes:    fluidotherapy      David received the following direct contact modalities after being cleared for contraindications for 6 minutes:  - U/S  3 Mhz @ 1.o w/cm2 X 6 minutes ,  continuous 100 duty cycle      David received the following manual therapy techniques for 10 minutes:   -STM over scar  -mini-massager over scar     David received therapeutic  exercises for 25 minutes including:  -Place and hold in fisting position 15 reps   Passive ROM flexion 10 reps   Active ROM fisting 15 reps    joint blocking 15 reps pip flexion  And DIP   TGEx x 10 reps  Reverse PIP kicks x 10 reps     towel walking 2 minutes   Little finger and thumb pinch to  buttons and pom poms 2 trials each   Yellow putty: 2' molding, 30 grasps, 30 scrapes      volar based hand splint for positioning to attempt to get increased PIP extension  Of right small finger  To wear only at night at this time       Discussed with patient that he is scarred down due to not starting therapy til now 4.5 weeks since surgery.  Emphasized the importance of attending therapy and working hard to possibly get tendons to move. Discussed options later that he may need a second surgery to remove scar tissue. Pt verbalized full understanding of this.         Home Exercises and Education Provided     Education provided:   - Pt instructed to increase his HEP to 5-6 x daily for ROM activities, yellow putty 1 x daily  - Progress towards goals: Fair     Written Home Exercises Provided: Patient instructed to cont prior HEP. Tendon glides and  Joint blocking   Exercises were reviewed and David was able to demonstrate them prior to the end of the session.  David demonstrated  Fair understanding of the education provided.   .     Assessment   Pt tolerated all activities with no difficulty. Advanced to yellow putty with no difficulty.    Pt would continue to benefit from skilled OT.  Yes      David is progressing well towards his goals and there are no updates to goals at this time. Pt prognosis is Fair.     Pt will continue to benefit from skilled outpatient occupational therapy to address the deficits listed in the problem list on initial evaluation provide pt/family education and to maximize pt's level of independence in the home and community environment.     Anticipated barriers to occupational therapy: increased  scarring  Due to not starting therapy til 5 weeks post op flexor tendon repair.     Pt's spiritual, cultural and educational needs considered and pt agreeable to plan of care and goals.      GOALS: 6 weeks. Pt agrees with goals set.        Short Term (6 weeks on 1/30/20  1)   Patient to be IND with HEP and modalities for pain management, progressing   2)   Increase ROM 15 degrees  For right small finger motion to increase functional hand use for  Right hand , progressing         Long Term (by discharge):  1)   Pt will report 0 out of 10 pain with right hand ., progressing   2)   Patient to score of CJ  on FOTO to demonstrate improved perception of functionalright hand/ arm  Use. Progressing   3)   Pt will return to prior level of function for ADLs and household management, progressing          Plan   Plan of Care Certification Period:  til 2/1/20  Discussed Plan of Care with patient: Yes  Updates/Grading for next session: continue       Nita Blanco, OT

## 2020-01-14 ENCOUNTER — CLINICAL SUPPORT (OUTPATIENT)
Dept: REHABILITATION | Facility: HOSPITAL | Age: 50
End: 2020-01-14
Payer: MEDICAID

## 2020-01-14 DIAGNOSIS — S61.316A LACERATION OF RIGHT LITTLE FINGER WITHOUT FOREIGN BODY WITH DAMAGE TO NAIL, INITIAL ENCOUNTER: Primary | ICD-10-CM

## 2020-01-14 PROCEDURE — 97110 THERAPEUTIC EXERCISES: CPT

## 2020-01-14 PROCEDURE — 97022 WHIRLPOOL THERAPY: CPT

## 2020-01-14 NOTE — PROGRESS NOTES
Occupational Therapy Daily Treatment Note   Date 1/14/2020  Name: David Fam  Clinic Number: [id number]     Therapy Diagnosis:  Right small finger Flexor tendon repair 11/7/19        Encounter Diagnosis   Name Primary?    Laceration of right little finger without foreign body with damage to nail, initial encounter Yes      Physician: Chris Daly III*     Physician Orders: orders from 11/15/19  eval and treat ,      Medical Diagnosis: S61.316A (ICD-10-CM) - Laceration of right little finger without foreign body with damage to nail      Surgical Procedure/ Date :11/7/19 had flexor tendon repair right small finger   Evaluation Date: 12/10/2019  Insurance:medicaid   Insurance Authorization period Expiration: 12/31/19   Plan of Care Certification Period: 2/1/20     Visit # / Visits Authortized: 5/ /12  Time In:2:00  pm  Time Out: 3:00 pm  Total Billable Time: 50 minutes     Pt is s/p 8 weeks today 12/31/19      Precautions: HIV precautions       Subjective   Pt states that he sees DR daly 1/24/20 . He reports that he has not seen much change and realizes that he is scarred down, he hoped to talk surgery options with DR Daly and plans to get scheduled soon with therapy after surgery this time to avoid any scarring .       Pt reports: he was compliant with home exercise program given last session.  He thinks that he might  Have a little more motion .     Response to previous treatment: slight increase flexion at mp joints   Functional change:  No functional change noted     Pain: 2/10  Location: right fingers      Objective   David received the following supervised modalities after being cleared for contradictions for 10 minutes:    fluidotherapy      David received the following direct contact modalities after being cleared for contraindications for 6 minutes:  - U/S  3 Mhz @ 1.o w/cm2 X 6 minutes ,  continuous 100 duty cycle      David received the following manual therapy  techniques for 10 minutes:   -STM over scar  -mini-massager over scar     David received therapeutic exercises for 25 minutes including:  -Place and hold in fisting position 15 reps   Passive ROM flexion 10 reps   Active ROM fisting 15 reps    joint blocking 15 reps pip flexion  And DIP   TGEx x 10 reps  Reverse PIP kicks x 10 reps     towel walking 2 minutes   Little finger and thumb pinch to  buttons and pom poms 2 trials each   Yellow putty: 2' molding, 30 grasps, 30 scrapes      volar based hand splint for positioning to attempt to get increased PIP extension  Of right small finger  To wear only at night at this time       Discussed with patient that he is scarred down due to not starting therapy til now 4.5 weeks since surgery.  Emphasized the importance of attending therapy and working hard to possibly get tendons to move. Discussed options later that he may need a second surgery to remove scar tissue. Pt verbalized full understanding of this.         Home Exercises and Education Provided     Education provided:   - Pt instructed to increase his HEP to 5-6 x daily for ROM activities, yellow putty 1 x daily  - Progress towards goals: Fair     Written Home Exercises Provided: Patient instructed to cont prior HEP. Tendon glides and  Joint blocking   Exercises were reviewed and David was able to demonstrate them prior to the end of the session.  David demonstrated  Fair understanding of the education provided.   .     Assessment   Pt tolerated all activities with no difficulty. Advanced to yellow putty with no difficulty.    Pt would continue to benefit from skilled OT.  Yes      David is progressing well towards his goals and there are no updates to goals at this time. Pt prognosis is Fair.     Pt will continue to benefit from skilled outpatient occupational therapy to address the deficits listed in the problem list on initial evaluation provide pt/family education and to maximize pt's level of  independence in the home and community environment.     Anticipated barriers to occupational therapy: increased scarring  Due to not starting therapy til 5 weeks post op flexor tendon repair.     Pt's spiritual, cultural and educational needs considered and pt agreeable to plan of care and goals.      GOALS: 6 weeks. Pt agrees with goals set.        Short Term (6 weeks on 1/30/20  1)   Patient to be IND with HEP and modalities for pain management, progressing   2)   Increase ROM 15 degrees  For right small finger motion to increase functional hand use for  Right hand , progressing         Long Term (by discharge):  1)   Pt will report 0 out of 10 pain with right hand ., progressing   2)   Patient to score of CJ  on FOTO to demonstrate improved perception of functionalright hand/ arm  Use. Progressing   3)   Pt will return to prior level of function for ADLs and household management, progressing          Plan   Plan of Care Certification Period:  til 2/1/20  Discussed Plan of Care with patient: Yes  Updates/Grading for next session: continue       Nita Blanco, OT

## 2020-01-23 ENCOUNTER — CLINICAL SUPPORT (OUTPATIENT)
Dept: REHABILITATION | Facility: HOSPITAL | Age: 50
End: 2020-01-23
Attending: INTERNAL MEDICINE
Payer: MEDICAID

## 2020-01-23 DIAGNOSIS — S61.316A LACERATION OF RIGHT LITTLE FINGER WITHOUT FOREIGN BODY WITH DAMAGE TO NAIL, INITIAL ENCOUNTER: Primary | ICD-10-CM

## 2020-01-23 PROCEDURE — 97110 THERAPEUTIC EXERCISES: CPT

## 2020-01-23 PROCEDURE — 97022 WHIRLPOOL THERAPY: CPT

## 2020-01-29 DIAGNOSIS — S61.316A: ICD-10-CM

## 2020-01-29 DIAGNOSIS — S61.316A: Primary | ICD-10-CM

## 2020-01-30 ENCOUNTER — CLINICAL SUPPORT (OUTPATIENT)
Dept: REHABILITATION | Facility: HOSPITAL | Age: 50
End: 2020-01-30
Payer: MEDICAID

## 2020-01-30 DIAGNOSIS — S61.316D LACERATION OF RIGHT LITTLE FINGER WITHOUT FOREIGN BODY WITH DAMAGE TO NAIL, SUBSEQUENT ENCOUNTER: ICD-10-CM

## 2020-01-30 PROCEDURE — 97110 THERAPEUTIC EXERCISES: CPT

## 2020-01-30 PROCEDURE — 97022 WHIRLPOOL THERAPY: CPT

## 2020-01-31 NOTE — PROGRESS NOTES
"                            Occupational Therapy Daily Treatment Note   Date 1/30/2020  Name: David Fma  Clinic Number: [id number]     Therapy Diagnosis:  Right small finger Flexor tendon repair 11/7/19        Encounter Diagnosis   Name Primary?    Laceration of right little finger without foreign body with damage to nail, initial encounter Yes      Physician: Chris Daly III*     Physician Orders: orders from 11/15/19  eval and treat ,      Medical Diagnosis: S61.316A (ICD-10-CM) - Laceration of right little finger without foreign body with damage to nail      Surgical Procedure/ Date :11/7/19 had flexor tendon repair right small finger   Evaluation Date: 12/10/2019  Insurance:medicaid   Insurance Authorization period Expiration: 1/31/20   Plan of Care Certification Period: 2/1/20     Visit # / Visits Authortized: 6/ 12   Time In:3:00 pm   Time Out: 3:40Pm   Total Billable Time: 50 minutes     Pt is s/p 8 weeks today 12/31/19      Precautions: HIV precautions       Subjective   Pt states that he sees DR daly 1/24/20 .  Dr Daly told him to "continue therapy and have then remake you a splint to stretch your finger in extension since you lost the last one" .  They discussed surgery might be an option but not now for 6 months or so.    Pt reports: he was compliant with home exercise program given last session.  He thinks that he might  Have a little more motion .     Response to previous treatment: slight increase flexion at mp joints   Functional change:  No functional change noted     Pain: 2/10  Location: right fingers      Objective   David received the following supervised modalities after being cleared for contradictions for 10 minutes:    fluidotherapy      David received the following direct contact modalities after being cleared for contraindications for 6 minutes:  - U/S  3 Mhz @ 1.o w/cm2 X 6 minutes ,  continuous 100 duty cycle      David received the following manual therapy techniques for 10 " minutes:   -STM over scar  -mini-massager over scar     David received therapeutic exercises for 25 minutes including:  -Place and hold in fisting position 15 reps   Passive ROM flexion 10 reps   Active ROM fisting 15 reps    joint blocking 15 reps pip flexion  And DIP   TGEx x 10 reps  Reverse PIP kicks x 10 reps     towel walking 2 minutes   Little finger and thumb pinch to  buttons and pom poms 2 trials each   Yellow putty: 2' molding, 30 grasps, 30 scrapes      Re made a new volar based hand splint for positioning to attempt to get increased PIP extension  Of right small finger  To wear only at night at this time and at least 4-5 hours during the day as able.          Home Exercises and Education Provided     Education provided:   - Pt instructed to increase his HEP to 5-6 x daily for ROM activities, yellow putty 1 x daily  - Progress towards goals: Fair     Written Home Exercises Provided: Patient instructed to cont prior HEP. Tendon glides and  Joint blocking   Exercises were reviewed and David was able to demonstrate them prior to the end of the session.  David demonstrated  Fair understanding of the education provided.   .     Assessment   Pt tolerated all activities with no difficulty. Advanced to yellow putty with no difficulty.    Pt would continue to benefit from skilled OT.  Yes      David is progressing well towards his goals and there are no updates to goals at this time. Pt prognosis is Fair.     Pt will continue to benefit from skilled outpatient occupational therapy to address the deficits listed in the problem list on initial evaluation provide pt/family education and to maximize pt's level of independence in the home and community environment.     Anticipated barriers to occupational therapy: increased scarring  Due to not starting therapy til 5 weeks post op flexor tendon repair. Pt with significant flexion contracture to little finger .     Pt's spiritual, cultural and educational needs  considered and pt agreeable to plan of care and goals.      GOALS: 6 weeks. Pt agrees with goals set.        Short Term (6 weeks on 1/30/20  1)   Patient to be IND with HEP and modalities for pain management, progressing   2)   Increase ROM 15 degrees  For right small finger motion to increase functional hand use for  Right hand , progressing         Long Term (by discharge):  1)   Pt will report 0 out of 10 pain with right hand ., progressing   2)   Patient to score of CJ  on FOTO to demonstrate improved perception of functionalright hand/ arm  Use. Progressing   3)   Pt will return to prior level of function for ADLs and household management, progressing          Plan   Plan of Care Certification Period:  til 3/30/20, continued splinting to try to gain finger extension of Pip joint.   Discussed Plan of Care with patient: Yes  Updates/Grading for next session: continue       Nita Blanco, OT

## 2020-02-04 ENCOUNTER — CLINICAL SUPPORT (OUTPATIENT)
Dept: REHABILITATION | Facility: HOSPITAL | Age: 50
End: 2020-02-04
Payer: MEDICAID

## 2020-02-04 DIAGNOSIS — S61.316D LACERATION OF RIGHT LITTLE FINGER WITHOUT FOREIGN BODY WITH DAMAGE TO NAIL, SUBSEQUENT ENCOUNTER: Primary | ICD-10-CM

## 2020-02-04 PROCEDURE — 97022 WHIRLPOOL THERAPY: CPT

## 2020-02-04 PROCEDURE — 97110 THERAPEUTIC EXERCISES: CPT

## 2020-02-05 NOTE — PROGRESS NOTES
"                            Occupational Therapy Daily Treatment Note   Date 2/4/2020  Name: David Fam  Clinic Number: [id number]     Therapy Diagnosis:  Right small finger Flexor tendon repair 11/7/19        Encounter Diagnosis   Name Primary?    Laceration of right little finger without foreign body with damage to nail, initial encounter Yes      Physician: Chris Daly III*     Physician Orders: orders from 11/15/19  eval and treat ,      Medical Diagnosis: S61.316A (ICD-10-CM) - Laceration of right little finger without foreign body with damage to nail      Surgical Procedure/ Date :11/7/19 had flexor tendon repair right small finger   Evaluation Date: 12/10/2019  Insurance:medicaid   Insurance Authorization period Expiration: 1/31/20   Plan of Care Certification Period: 3/30/20     Visit # / Visits Authortized: 7/ 12   Time In:4;30 pm    Time Out: 5;30 pm    Total Billable Time: 50 minutes          Precautions: HIV precautions       Subjective   Pt states that he sees DR daly 1/24/20 .  Dr Daly told him to "continue therapy and have then remake you a splint to stretch your finger in extension since you lost the last one" .  They discussed surgery might be an option but not now for 6 months or so.    Pt reports: he was compliant with home exercise program given last session.  He thinks that he might  Have a little more motion .     Response to previous treatment: slight increase flexion at mp joints   Functional change:  No functional change noted     Pain: 2/10  Location: right fingers      Objective   David received the following supervised modalities after being cleared for contradictions for 10 minutes:    fluidotherapy      David received the following direct contact modalities after being cleared for contraindications for 6 minutes:  - U/S  3 Mhz @ 1.o w/cm2 X 6 minutes ,  continuous 100 duty cycle      David received the following manual therapy techniques for 10 minutes:   -STM over " scar  -mini-massager over scar     David received therapeutic exercises for 25 minutes including:  -Place and hold in fisting position 15 reps   Passive ROM flexion 10 reps   Active ROM fisting 15 reps    joint blocking 15 reps pip flexion  And DIP   TGEx x 10 reps  Reverse PIP kicks x 10 reps     towel walking 2 minutes   Little finger and thumb pinch to  buttons and pom poms 2 trials each   Yellow putty: 2' molding, 30 grasps, 30 scrapes       checked volar based hand splint for positioning to attempt to get increased PIP extension  Of right small finger  appears to fit well.    Pt reports that he is wearing it about 1.5 hours before it gets slightly painful. Encouraging to wear 4-5 five hours a day to decrease finger flexion contracture.           Home Exercises and Education Provided     Education provided:   - Pt instructed to increase his HEP to 5-6 x daily for ROM activities, yellow putty 1 x daily  - Progress towards goals: Fair     Written Home Exercises Provided: Patient instructed to cont prior HEP. Tendon glides and  Joint blocking   Exercises were reviewed and David was able to demonstrate them prior to the end of the session.  David demonstrated  Fair understanding of the education provided.   .     Assessment   Pt tolerated all activities with no difficulty. Advanced to yellow putty with no difficulty.    Pt would continue to benefit from skilled OT.  Yes      David is progressing well towards his goals and there are no updates to goals at this time. Pt prognosis is Fair.     Pt will continue to benefit from skilled outpatient occupational therapy to address the deficits listed in the problem list on initial evaluation provide pt/family education and to maximize pt's level of independence in the home and community environment.     Anticipated barriers to occupational therapy: increased scarring  Due to not starting therapy til 5 weeks post op flexor tendon repair. Pt with significant flexion  contracture to little finger .     Pt's spiritual, cultural and educational needs considered and pt agreeable to plan of care and goals.      GOALS: 6 weeks. Pt agrees with goals set.        Short Term   1)   Patient to be IND with HEP and modalities for pain management,  Met    2)   Increase ROM 15 degrees  For right small finger motion to increase functional hand use for  Right hand , progressing         Long Term (by discharge):  1)   Pt will report 0 out of 10 pain with right hand ., progressing   2)   Patient to score of CJ  on FOTO to demonstrate improved perception of functionalright hand/ arm  Use. Progressing   3)   Pt will return to prior level of function for ADLs and household management, progressing          Plan   Plan of Care Certification Period:  til 3/30/20, continued splinting to try to gain finger extension of Pip joint.   Discussed Plan of Care with patient: Yes  Updates/Grading for next session: continue       Nita Blanco, OT

## 2020-02-11 ENCOUNTER — CLINICAL SUPPORT (OUTPATIENT)
Dept: REHABILITATION | Facility: HOSPITAL | Age: 50
End: 2020-02-11
Payer: MEDICAID

## 2020-02-11 DIAGNOSIS — S61.316D LACERATION OF RIGHT LITTLE FINGER WITHOUT FOREIGN BODY WITH DAMAGE TO NAIL, SUBSEQUENT ENCOUNTER: Primary | ICD-10-CM

## 2020-02-11 PROCEDURE — 97140 MANUAL THERAPY 1/> REGIONS: CPT

## 2020-02-11 PROCEDURE — 97110 THERAPEUTIC EXERCISES: CPT

## 2020-02-11 PROCEDURE — 97022 WHIRLPOOL THERAPY: CPT

## 2020-02-11 NOTE — PROGRESS NOTES
"                            Occupational Therapy Daily Treatment Note   Date 2/11/2020  Name: David Fam  Clinic Number: [id number]     Therapy Diagnosis:  Right small finger Flexor tendon repair 11/7/19        Encounter Diagnosis   Name Primary?    Laceration of right little finger without foreign body with damage to nail, initial encounter Yes      Physician: Chris Daly III*     Physician Orders: orders from 11/15/19  eval and treat ,      Medical Diagnosis: S61.316A (ICD-10-CM) - Laceration of right little finger without foreign body with damage to nail      Surgical Procedure/ Date :11/7/19 had flexor tendon repair right small finger   Evaluation Date: 12/10/2019  Insurance:medicaid   Insurance Authorization period Expiration: 1/31/20   Plan of Care Certification Period: 3/30/20     Visit # / Visits Authortized: 7/ 12   Time In:3;00  pm    Time Out: 4:00  pm    Total Billable Time: 50 minutes          Precautions: HIV precautions       Subjective   Pt states that he sees DR daly 1/24/20 .  Dr Daly told him to "continue therapy and have them remake you a splint to stretch your finger in extension since you lost the last one" .  They discussed surgery might be an option but not now for 6 months or so.  Next follow up with MD is end of march 2020.     Pt reports: he  Reports that he has been wearing his splint at least 4 times a day for 1 -2 hours .   PT is  compliant with home exercise program given last session.  He thinks that he might  Have a little more motion .     Response to previous treatment: slight increase flexion at mp joints   Functional change:  No functional change noted     Pain: 2/10  Location: right fingers      Objective   David received the following supervised modalities after being cleared for contradictions for 10 minutes:    fluidotherapy      David received the following direct contact modalities after being cleared for contraindications for 6 minutes:  - U/S  3 Mhz @ 1.o w/cm2 " X 6 minutes ,  continuous 100 duty cycle      David received the following manual therapy techniques for 15 minutes:   -STM over scar  -mini-massager over scar  Passive ROM pip flexion and extension     David received therapeutic exercises for 30  minutes including:  -Place and hold in fisting position 15 reps   Passive ROM flexion 10 reps   Active ROM fisting 15 reps    joint blocking 15 reps pip flexion  And DIP   TGEx x 10 reps  Reverse PIP kicks x 10 reps     towel walking 2 minutes   Little finger and thumb pinch to  buttons and pom poms 2 trials each   Yellow putty: 2' molding, 30 grasps, 30 scrapes       checked volar based hand splint for positioning to attempt to get increased PIP extension  Of right small finger  appears to fit well.    Pt reports that he is wearing it about 1.5 hours before it gets slightly painful. Encouraging to wear 4-5 five hours a day to decrease finger flexion contracture.       AROM    2/11/20       right      MP 0/90     PIP -60/70     DIP  0/50                     Home Exercises and Education Provided     Education provided:   - Pt instructed to increase his HEP to 5-6 x daily for ROM activities, yellow putty 1 x daily  - Progress towards goals: Fair     Written Home Exercises Provided: Patient instructed to cont prior HEP. Tendon glides and  Joint blocking   Exercises were reviewed and David was able to demonstrate them prior to the end of the session.  David demonstrated  Fair understanding of the education provided.   .     Assessment   Pt tolerated all activities with no difficulty. Advanced to yellow putty with no difficulty.  Pt with limited total motion to pip joint, significant flexion contracture. Continue with static progressive splinting and will attempt to adjust to increase extension at Pip joint as able.     Pt would continue to benefit from skilled OT.  Yes      David is progressing poorly  towards his goals and there are no updates to goals at this time. Pt  prognosis is Fair.     Pt will continue to benefit from skilled outpatient occupational therapy to address the deficits listed in the problem list on initial evaluation provide pt/family education and to maximize pt's level of independence in the home and community environment.     Anticipated barriers to occupational therapy: increased scarring  Due to not starting therapy til 5 weeks post op flexor tendon repair. Pt with significant flexion contracture to little finger .     Pt's spiritual, cultural and educational needs considered and pt agreeable to plan of care and goals.      GOALS: 6 weeks. Pt agrees with goals set.        Short Term   1)   Patient to be IND with HEP and modalities for pain management,  Met    2)   Increase ROM 15 degrees  For right small finger motion to increase functional hand use for  Right hand , progressing         Long Term (by discharge):  1)   Pt will report 0 out of 10 pain with right hand ., progressing   2)   Patient to score of CJ  on FOTO to demonstrate improved perception of functionalright hand/ arm  Use. Progressing   3)   Pt will return to prior level of function for ADLs and household management, progressing          Plan   Plan of Care Certification Period:  til 3/30/20, continued splinting to try to gain finger extension of Pip joint.   Discussed Plan of Care with patient: Yes  Updates/Grading for next session: continue       Nita Blanco, OT

## 2020-02-18 ENCOUNTER — CLINICAL SUPPORT (OUTPATIENT)
Dept: REHABILITATION | Facility: HOSPITAL | Age: 50
End: 2020-02-18
Payer: MEDICAID

## 2020-02-18 DIAGNOSIS — S61.316D LACERATION OF RIGHT LITTLE FINGER WITHOUT FOREIGN BODY WITH DAMAGE TO NAIL, SUBSEQUENT ENCOUNTER: Primary | ICD-10-CM

## 2020-02-18 PROCEDURE — 97110 THERAPEUTIC EXERCISES: CPT

## 2020-02-18 PROCEDURE — 97022 WHIRLPOOL THERAPY: CPT

## 2020-02-18 PROCEDURE — 97140 MANUAL THERAPY 1/> REGIONS: CPT

## 2020-02-19 NOTE — PROGRESS NOTES
"                            Occupational Therapy Daily Treatment Note   Date 2/18/2020  Name: David Fam  Clinic Number: [id number]     Therapy Diagnosis:  Right small finger Flexor tendon repair 11/7/19        Encounter Diagnosis   Name Primary?    Laceration of right little finger without foreign body with damage to nail, initial encounter Yes      Physician: Chris Daly III*     Physician Orders: orders from 11/15/19  eval and treat ,      Medical Diagnosis: S61.316A (ICD-10-CM) - Laceration of right little finger without foreign body with damage to nail      Surgical Procedure/ Date :11/7/19 had flexor tendon repair right small finger   Evaluation Date: 12/10/2019  Insurance:medicaid   Insurance Authorization period Expiration: 1/31/20   Plan of Care Certification Period: 3/30/20     Visit # / Visits Authortized: 7/ 12   Time In:3;00  pm    Time Out: 4:00  pm    Total Billable Time: 50 minutes          Precautions: HIV precautions       Subjective   Pt states that he sees DR daly 1/24/20 .  Dr Daly told him to "continue therapy and have them remake you a splint to stretch your finger in extension since you lost the last one" .  They discussed surgery might be an option but not now for 6 months or so.  Next follow up with MD is end of march 2020.     Pt reports: he  Reports that he has been wearing his splint at least 4 times a day for 1 -2 hours .   PT is  compliant with home exercise program given last session.  He thinks that he might  Have a little more motion .     Response to previous treatment: slight increase flexion at mp joints   Functional change:  No functional change noted     Pain: 2/10  Location: right fingers      Objective   David received the following supervised modalities after being cleared for contradictions for 10 minutes:    fluidotherapy      David received the following direct contact modalities after being cleared for contraindications for 6 minutes:  - U/S  3 Mhz @ 1.o w/cm2 " X 6 minutes ,  continuous 100 duty cycle      David received the following manual therapy techniques for 15 minutes:   -STM over scar  -mini-massager over scar  Passive ROM pip flexion and extension     David received therapeutic exercises for 30  minutes including:  -Place and hold in fisting position 15 reps   Passive ROM flexion 10 reps   Active ROM fisting 15 reps    joint blocking 15 reps pip flexion  And DIP   TGEx x 10 reps  Reverse PIP kicks x 10 reps     towel walking 2 minutes   Little finger and thumb pinch to  buttons and pom poms 2 trials each   Yellow putty: 2' molding, 30 grasps, 30 scrapes      !0 minutes to remold small finger portion of splint to  gain extension , pt stated that new fi t felt better .      checked volar based hand splint for positioning to attempt to get increased PIP extension  Of right small finger  appears to fit well.    Pt reports that he is wearing it about 1.5 hours before it gets slightly painful. Encouraging to wear 4-5 five hours a day to decrease finger flexion contracture.       AROM    2/11/20       right      MP 0/90     PIP -60/70     DIP  0/50                     Home Exercises and Education Provided     Education provided:   - Pt instructed to increase his HEP to 5-6 x daily for ROM activities, yellow putty 1 x daily  - Progress towards goals: Fair     Written Home Exercises Provided: Patient instructed to cont prior HEP. Tendon glides and  Joint blocking   Exercises were reviewed and David was able to demonstrate them prior to the end of the session.  David demonstrated  Fair understanding of the education provided.   .     Assessment   Pt tolerated all activities with no difficulty. Advanced to yellow putty with no difficulty.  Pt with limited total motion to pip joint, significant flexion contracture. Continue with static progressive splinting and will attempt to adjust to increase extension at Pip joint as able.     Pt would continue to benefit from  skilled OT.  Yes      David is progressing poorly  towards his goals and there are no updates to goals at this time. Pt prognosis is Fair.     Pt will continue to benefit from skilled outpatient occupational therapy to address the deficits listed in the problem list on initial evaluation provide pt/family education and to maximize pt's level of independence in the home and community environment.     Anticipated barriers to occupational therapy: increased scarring  Due to not starting therapy til 5 weeks post op flexor tendon repair. Pt with significant flexion contracture to little finger .     Pt's spiritual, cultural and educational needs considered and pt agreeable to plan of care and goals.      GOALS: 6 weeks. Pt agrees with goals set.        Short Term   1)   Patient to be IND with HEP and modalities for pain management,  Met    2)   Increase ROM 15 degrees  For right small finger motion to increase functional hand use for  Right hand , progressing         Long Term (by discharge):  1)   Pt will report 0 out of 10 pain with right hand ., progressing   2)   Patient to score of CJ  on FOTO to demonstrate improved perception of functionalright hand/ arm  Use. Progressing   3)   Pt will return to prior level of function for ADLs and household management, progressing          Plan   Plan of Care Certification Period:  til 3/30/20, continued splinting to try to gain finger extension of Pip joint.   Discussed Plan of Care with patient: Yes  Updates/Grading for next session: continue       Nita Blanco OT

## 2020-03-03 ENCOUNTER — CLINICAL SUPPORT (OUTPATIENT)
Dept: REHABILITATION | Facility: HOSPITAL | Age: 50
End: 2020-03-03
Payer: MEDICAID

## 2020-03-03 DIAGNOSIS — S61.316D LACERATION OF RIGHT LITTLE FINGER WITHOUT FOREIGN BODY WITH DAMAGE TO NAIL, SUBSEQUENT ENCOUNTER: Primary | ICD-10-CM

## 2020-03-03 PROCEDURE — 97110 THERAPEUTIC EXERCISES: CPT

## 2020-03-03 PROCEDURE — 97022 WHIRLPOOL THERAPY: CPT

## 2020-03-03 NOTE — PROGRESS NOTES
"                            Occupational Therapy Daily Treatment Note   Date 3/3/2020  Name: David Fam  Clinic Number: [id number]     Therapy Diagnosis:  Right small finger Flexor tendon repair 11/7/19        Encounter Diagnosis   Name Primary?    Laceration of right little finger without foreign body with damage to nail, initial encounter Yes      Physician: Chris Daly III*     Physician Orders: orders from 11/15/19  eval and treat ,      Medical Diagnosis: S61.316A (ICD-10-CM) - Laceration of right little finger without foreign body with damage to nail      Surgical Procedure/ Date :11/7/19 had flexor tendon repair right small finger   Evaluation Date: 12/10/2019  Insurance:medicaid   Insurance Authorization period Expiration: 1/31/20   Plan of Care Certification Period: 3/30/20     Visit # / Visits Authortized: 7/ 12   Time In:3;00  pm    Time Out: 4:00  pm    Total Billable Time: 50 minutes          Precautions: HIV precautions       Subjective   Pt states that he sees DR daly 3/12/20  .  Dr Daly told him to "continue therapy and have them remake you a splint to stretch your finger in extension since you lost the last one" .  They discussed surgery might be an option but not now for 6 months or so.  Next follow up with MD is end of march 2020.     Pt reports: he  Reports that he has been wearing his splint at least 4 times a day for 1 -2 hours .   PT is  compliant with home exercise program given last session.  He thinks that he might  Have a little more motion .     Response to previous treatment: slight increase flexion at mp joints   Functional change:  No functional change noted     Pain: 2/10  Location: right fingers      Objective   David received the following supervised modalities after being cleared for contradictions for 10 minutes:    fluidotherapy      David received the following direct contact modalities after being cleared for contraindications for 6 minutes:  - U/S  3 Mhz @ 1.o w/cm2 " X 6 minutes ,  continuous 100 duty cycle      David received the following manual therapy techniques for 15 minutes:   -STM over scar  -mini-massager over scar  Passive ROM pip flexion and extension     David received therapeutic exercises for 30  minutes including:  -Place and hold in fisting position 15 reps   Passive ROM flexion 10 reps   Active ROM fisting 15 reps    joint blocking 15 reps pip flexion  And DIP   TGEx x 10 reps  Reverse PIP kicks x 10 reps     towel walking 2 minutes   Little finger and thumb pinch to  buttons and pom poms 2 trials each   Yellow putty: 2' molding, 30 grasps, 30 scrapes      !0 minutes to remold small finger portion of splint to  gain extension , pt stated that new fi t felt better .      checked volar based hand splint for positioning to attempt to get increased PIP extension  Of right small finger  appears to fit well.    Pt reports that he is wearing it about 1.5 hours before it gets slightly painful. Encouraging to wear 4-5 five hours a day to decrease finger flexion contracture.       AROM    2/11/20 3/3/20       right  Right     MP 0/90 0/90    PIP -60/70 -58/70    DIP  0/50 -30/40                     Home Exercises and Education Provided     Education provided:   - Pt instructed to increase his HEP to 5-6 x daily for ROM activities, yellow putty 1 x daily  - Progress towards goals: Fair     Written Home Exercises Provided: Patient instructed to cont prior HEP. Tendon glides and  Joint blocking   Exercises were reviewed and David was able to demonstrate them prior to the end of the session.  David demonstrated  Fair understanding of the education provided.   .     Assessment   Pt tolerated all activities with no difficulty. Advanced to yellow putty with no difficulty.  Pt with limited total motion to pip joint, significant flexion contracture. Continue with static progressive splinting and will attempt to adjust to increase extension at Pip joint as able.     Pt would  continue to benefit from skilled OT.  Yes      David is progressing poorly  towards his goals and there are no updates to goals at this time. Pt prognosis is Fair.     Pt will continue to benefit from skilled outpatient occupational therapy to address the deficits listed in the problem list on initial evaluation provide pt/family education and to maximize pt's level of independence in the home and community environment.     Anticipated barriers to occupational therapy: increased scarring  Due to not starting therapy til 5 weeks post op flexor tendon repair. Pt with significant flexion contracture to little finger .     Pt's spiritual, cultural and educational needs considered and pt agreeable to plan of care and goals.      GOALS: 6 weeks. Pt agrees with goals set.        Short Term   1)   Patient to be IND with HEP and modalities for pain management,  Met    2)   Increase ROM 15 degrees  For right small finger motion to increase functional hand use for  Right hand , progressing         Long Term (by discharge):  1)   Pt will report 0 out of 10 pain with right hand ., progressing   2)   Patient to score of CJ  on FOTO to demonstrate improved perception of functionalright hand/ arm  Use. Progressing   3)   Pt will return to prior level of function for ADLs and household management, progressing          Plan   Plan of Care Certification Period:  til 3/30/20  continued splinting to try to gain finger extension of Pip joint.   Discussed Plan of Care with patient: Yes  Updates/Grading for next session: continue       Nita Blanco, OT

## 2020-03-05 ENCOUNTER — CLINICAL SUPPORT (OUTPATIENT)
Dept: REHABILITATION | Facility: HOSPITAL | Age: 50
End: 2020-03-05
Payer: MEDICAID

## 2020-03-05 DIAGNOSIS — S61.316D LACERATION OF RIGHT LITTLE FINGER WITHOUT FOREIGN BODY WITH DAMAGE TO NAIL, SUBSEQUENT ENCOUNTER: Primary | ICD-10-CM

## 2020-03-05 PROCEDURE — 97140 MANUAL THERAPY 1/> REGIONS: CPT

## 2020-03-05 PROCEDURE — 97110 THERAPEUTIC EXERCISES: CPT

## 2020-03-05 PROCEDURE — 97022 WHIRLPOOL THERAPY: CPT

## 2020-03-06 NOTE — PROGRESS NOTES
Occupational Therapy Daily Treatment Note   Date 3/5/2020  Name: David Fam  Clinic Number: [id number]     Therapy Diagnosis:  Right small finger Flexor tendon repair 11/7/19        Encounter Diagnosis   Name Primary?    Laceration of right little finger without foreign body with damage to nail, initial encounter Yes      Physician: Chris Daly III*     Physician Orders: orders from 11/15/19  eval and treat ,      Medical Diagnosis: S61.316A (ICD-10-CM) - Laceration of right little finger without foreign body with damage to nail      Surgical Procedure/ Date :11/7/19 had flexor tendon repair right small finger   Evaluation Date: 12/10/2019  Insurance:medicaid   Insurance Authorization period Expiration: 1/31/20   Plan of Care Certification Period: 3/30/20     Visit # / Visits Authortized: 8/ 12   Time In:4;30   pm    Time Out: 5:15   pm    Total Billable Time: 50 minutes          Precautions: HIV precautions       Subjective   Pt states that he sees DR daly 3/12/20  .   They discussed surgery might be an option but not now for 6 months or so.  Next follow up with MD is end of march 2020.     Pt reports: he  Reports that he has been wearing his splint at least 4 times a day for 1 -2 hours .   PT is  compliant with home exercise program given last session.  He thinks that he might  Have a little more motion .     Response to previous treatment: slight increase flexion at mp joints   Functional change:  No functional change noted     Pain: 2/10  Location: right fingers      Objective   David received the following supervised modalities after being cleared for contradictions for 10 minutes:    fluidotherapy      David received the following direct contact modalities after being cleared for contraindications for 6 minutes:  - U/S  3 Mhz @ 1.o w/cm2 X 6 minutes ,  continuous 100 duty cycle      David received the following manual therapy techniques for 15 minutes:   -STM over  scar  -mini-massager over scar  Passive ROM pip flexion and extension     David received therapeutic exercises for 30  minutes including:  -Place and hold in fisting position 15 reps   Passive ROM flexion / extension  10 reps       joint blocking 15 reps pip flexion  And DIP   TGEx  Wave ( as able ) x 10 reps  Reverse PIP kicks x 10 reps     towel walking 2 minutes   Little finger and thumb pinch to  buttons and pom poms 2 trials each   Yellow putty: 2' molding, 30 grasps, 30 scrapes       checked volar based hand splint for positioning to attempt to get increased PIP extension  Of right small finger  appears to fit well.    Pt reports that he is wearing it about 1.5 hours before it gets slightly painful. Encouraging to wear 4-5 five hours a day to decrease finger flexion contracture.       AROM    2/11/20 3/3/20       right  Right     MP 0/90 0/90    PIP -60/70 -58/70    DIP  0/50 -30/40                     Home Exercises and Education Provided     Education provided:   - Pt instructed to increase his HEP to 5-6 x daily for ROM activities, yellow putty 1 x daily  - Progress towards goals: Fair     Written Home Exercises Provided: Patient instructed to cont prior HEP. Tendon glides and  Joint blocking   Exercises were reviewed and David was able to demonstrate them prior to the end of the session.  David demonstrated  Fair understanding of the education provided.   .     Assessment   Pt tolerated all activities with no difficulty. Advanced to yellow putty with no difficulty.  Pt with limited total motion to pip joint, significant flexion contracture. Continue with static progressive splinting and will attempt to adjust to increase extension at Pip joint as able.     Pt would continue to benefit from skilled OT.  Yes      David is progressing poorly  towards his goals and there are no updates to goals at this time. Pt prognosis is Fair.     Pt will continue to benefit from skilled outpatient occupational  therapy to address the deficits listed in the problem list on initial evaluation provide pt/family education and to maximize pt's level of independence in the home and community environment.     Anticipated barriers to occupational therapy: increased scarring  Due to not starting therapy til 5 weeks post op flexor tendon repair. Pt with significant flexion contracture to little finger .     Pt's spiritual, cultural and educational needs considered and pt agreeable to plan of care and goals.      GOALS: 6 weeks. Pt agrees with goals set.        Short Term   1)   Patient to be IND with HEP and modalities for pain management,  Met    2)   Increase ROM 15 degrees  For right small finger motion to increase functional hand use for  Right hand , progressing         Long Term (by discharge):  1)   Pt will report 0 out of 10 pain with right hand ., progressing   2)   Patient to score of CJ  on FOTO to demonstrate improved perception of functionalright hand/ arm  Use. Progressing   3)   Pt will return to prior level of function for ADLs and household management, progressing          Plan   Plan of Care Certification Period:  til 3/30/20  continued splinting to try to gain finger extension of Pip joint.   Discussed Plan of Care with patient: Yes  Updates/Grading for next session: continue       Nita Blanco, OT

## 2020-03-10 ENCOUNTER — CLINICAL SUPPORT (OUTPATIENT)
Dept: REHABILITATION | Facility: HOSPITAL | Age: 50
End: 2020-03-10
Payer: MEDICAID

## 2020-03-10 DIAGNOSIS — S61.316D LACERATION OF RIGHT LITTLE FINGER WITHOUT FOREIGN BODY WITH DAMAGE TO NAIL, SUBSEQUENT ENCOUNTER: Primary | ICD-10-CM

## 2020-03-10 PROCEDURE — 97530 THERAPEUTIC ACTIVITIES: CPT

## 2020-03-10 NOTE — PROGRESS NOTES
Occupational Therapy Daily Treatment Note   Date 3/10/2020  Name: David Fam  Clinic Number: [id number]     Therapy Diagnosis:  Right small finger Flexor tendon repair 11/7/19        Encounter Diagnosis   Name Primary?    Laceration of right little finger without foreign body with damage to nail, initial encounter Yes      Physician: Chris Daly III*     Physician Orders: orders from 11/15/19  eval and treat ,      Medical Diagnosis: S61.316A (ICD-10-CM) - Laceration of right little finger without foreign body with damage to nail      Surgical Procedure/ Date :11/7/19 had flexor tendon repair right small finger   Evaluation Date: 12/10/2019  Insurance:medicaid   Insurance Authorization period Expiration: 1/31/20   Plan of Care Certification Period: 3/30/20     Visit # / Visits Authortized: 6/16   Time In:9:05 am    Time Out:9:55 am    Total Billable Time: 50  minutes          Precautions: HIV precautions       Subjective   Pt states that he sees DR daly 3/12/20  .   They discussed surgery might be an option but not now for 6 months or so.  Next follow up with MD is end of march 2020.     Pt reports: he  Reports that he has been wearing his splint at least 4 times a day for 1 -2 hours .   PT is  compliant with home exercise program given last session.       Response to previous treatment: no change  Functional change:  No functional change noted     Pain: 2/10  Location: right small finger     Objective       David received therapeutic activities for 50  minutes including:    Patient received fluidotherapy to right hand for 10 minutes to increase blood flow, circulation, desensitization, sensory re-education and for pain management.   -STM over scar  -mini-massager over scar  Passive ROM pip flexion and extension       - U/S  3 Mhz @ .8 w/cm2 X 8 minutes ,  continuous 100 duty cycle        -Place and hold in fisting position 15 reps   Passive ROM flexion / extension  10 reps        joint blocking 15 reps pip flexion  And DIP   TGEx  Wave ( as able ) x 10 reps  Reverse PIP kicks x 10 reps     towel walking 2 minutes   Little finger and thumb pinch to  buttons and pom poms 2 trials each   Yellow putty: 2' molding, 30 grasps, 30 scrapes             AROM    2/11/20 3/3/20      right  Right    MP 0/90 0/90   PIP -60/70 -58/70   DIP  0/50 -30/40                  Home Exercises and Education Provided     Education provided:   - none today  - Progress towards goals: Fair     Written Home Exercises Provided: Patient instructed to cont prior HEP. Tendon glides and  Joint blocking   Exercises were reviewed and David was able to demonstrate them prior to the end of the session.  David demonstrated  Fair understanding of the education provided.   .     Assessment   Pt tolerated all activities with no difficulty.  Pt with limited total motion to pip joint, significant flexion contracture. Continue with static progressive splinting and will attempt to adjust to increase extension at Pip joint as able.     Pt would continue to benefit from skilled OT.  Yes      David is progressing poorly  towards his goals and there are no updates to goals at this time. Pt prognosis is Fair.     Pt will continue to benefit from skilled outpatient occupational therapy to address the deficits listed in the problem list on initial evaluation provide pt/family education and to maximize pt's level of independence in the home and community environment.     Anticipated barriers to occupational therapy: increased scarring  Due to not starting therapy til 5 weeks post op flexor tendon repair. Pt with significant flexion contracture to little finger .     Pt's spiritual, cultural and educational needs considered and pt agreeable to plan of care and goals.      GOALS: 6 weeks. Pt agrees with goals set.        Short Term   1)   Patient to be IND with HEP and modalities for pain management,  Met    2)   Increase ROM 15 degrees   For right small finger motion to increase functional hand use for  Right hand , progressing         Long Term (by discharge):  1)   Pt will report 0 out of 10 pain with right hand ., progressing   2)   Patient to score of CJ  on FOTO to demonstrate improved perception of functionalright hand/ arm  Use. Progressing   3)   Pt will return to prior level of function for ADLs and household management, progressing          Plan   Plan of Care Certification Period:  til 3/30/20  continued splinting to try to gain finger extension of Pip joint.   Discussed Plan of Care with patient: Yes  Updates/Grading for next session: continue       Yolanda Pena, OT

## 2020-03-12 ENCOUNTER — CLINICAL SUPPORT (OUTPATIENT)
Dept: REHABILITATION | Facility: HOSPITAL | Age: 50
End: 2020-03-12
Payer: MEDICAID

## 2020-03-12 DIAGNOSIS — S61.316D LACERATION OF RIGHT LITTLE FINGER WITHOUT FOREIGN BODY WITH DAMAGE TO NAIL, SUBSEQUENT ENCOUNTER: Primary | ICD-10-CM

## 2020-03-12 PROCEDURE — 97022 WHIRLPOOL THERAPY: CPT

## 2020-03-12 PROCEDURE — 97110 THERAPEUTIC EXERCISES: CPT

## 2020-03-12 NOTE — PROGRESS NOTES
Occupational Therapy Daily Treatment Note   Date 1/23/2020  Name: David Fam  Clinic Number: [id number]     Therapy Diagnosis:  Right small finger Flexor tendon repair 11/7/19        Encounter Diagnosis   Name Primary?    Laceration of right little finger without foreign body with damage to nail, initial encounter Yes      Physician: Chris Daly III*     Physician Orders: orders from 11/15/19  eval and treat ,      Medical Diagnosis: S61.316A (ICD-10-CM) - Laceration of right little finger without foreign body with damage to nail      Surgical Procedure/ Date :11/7/19 had flexor tendon repair right small finger   Evaluation Date: 12/10/2019  Insurance:medicaid   Insurance Authorization period Expiration: 12/31/19   Plan of Care Certification Period: 2/1/20     Visit # / Visits Authortized: 5/ /12  Time In:2:00  pm  Time Out: 3:00 pm  Total Billable Time: 50 minutes     Pt is s/p 8 weeks today 12/31/19      Precautions: HIV precautions       Subjective   Pt states that he sees DR daly 1/24/20 . He reports that he has not seen much change and realizes that he is scarred down, he hoped to talk surgery options with DR Daly and plans to get scheduled soon with therapy after surgery this time to avoid any scarring .       Pt reports: he was compliant with home exercise program given last session.  He thinks that he might  Have a little more motion .     Response to previous treatment: slight increase flexion at mp joints   Functional change:  No functional change noted     Pain: 2/10  Location: right fingers      Objective   David received the following supervised modalities after being cleared for contradictions for 10 minutes:    fluidotherapy      David received the following direct contact modalities after being cleared for contraindications for 6 minutes:  - U/S  3 Mhz @ 1.o w/cm2 X 6 minutes ,  continuous 100 duty cycle      David received the following manual therapy  techniques for 10 minutes:   -STM over scar  -mini-massager over scar     David received therapeutic exercises for 25 minutes including:  -Place and hold in fisting position 15 reps   Passive ROM flexion 10 reps   Active ROM fisting 15 reps    joint blocking 15 reps pip flexion  And DIP   TGEx x 10 reps  Reverse PIP kicks x 10 reps     towel walking 2 minutes   Little finger and thumb pinch to  buttons and pom poms 2 trials each   Yellow putty: 2' molding, 30 grasps, 30 scrapes      volar based hand splint for positioning to attempt to get increased PIP extension  Of right small finger  To wear only at night at this time       Discussed with patient that he is scarred down due to not starting therapy til now 4.5 weeks since surgery.  Emphasized the importance of attending therapy and working hard to possibly get tendons to move. Discussed options later that he may need a second surgery to remove scar tissue. Pt verbalized full understanding of this.         Home Exercises and Education Provided     Education provided:   - Pt instructed to increase his HEP to 5-6 x daily for ROM activities, yellow putty 1 x daily  - Progress towards goals: Fair     Written Home Exercises Provided: Patient instructed to cont prior HEP. Tendon glides and  Joint blocking   Exercises were reviewed and David was able to demonstrate them prior to the end of the session.  David demonstrated  Fair understanding of the education provided.   .     Assessment   Pt tolerated all activities with no difficulty. Advanced to yellow putty with no difficulty.    Pt would continue to benefit from skilled OT.  Yes      David is progressing well towards his goals and there are no updates to goals at this time. Pt prognosis is Fair.     Pt will continue to benefit from skilled outpatient occupational therapy to address the deficits listed in the problem list on initial evaluation provide pt/family education and to maximize pt's level of  independence in the home and community environment.     Anticipated barriers to occupational therapy: increased scarring  Due to not starting therapy til 5 weeks post op flexor tendon repair.     Pt's spiritual, cultural and educational needs considered and pt agreeable to plan of care and goals.      GOALS: 6 weeks. Pt agrees with goals set.        Short Term (6 weeks on 1/30/20  1)   Patient to be IND with HEP and modalities for pain management, progressing   2)   Increase ROM 15 degrees  For right small finger motion to increase functional hand use for  Right hand , progressing         Long Term (by discharge):  1)   Pt will report 0 out of 10 pain with right hand ., progressing   2)   Patient to score of CJ  on FOTO to demonstrate improved perception of functionalright hand/ arm  Use. Progressing   3)   Pt will return to prior level of function for ADLs and household management, progressing          Plan   Plan of Care Certification Period:  til 2/1/20  Discussed Plan of Care with patient: Yes  Updates/Grading for next session: continue       Nita Blanco, OT

## 2020-03-12 NOTE — PROGRESS NOTES
Occupational Therapy Daily Treatment Note   Date 3/12/2020  Name: David Fam  Clinic Number: [id number]     Therapy Diagnosis:  Right small finger Flexor tendon repair 11/7/19        Encounter Diagnosis   Name Primary?    Laceration of right little finger without foreign body with damage to nail, initial encounter Yes      Physician: Chris Daly III*     Physician Orders: orders from 11/15/19  eval and treat ,      Medical Diagnosis: S61.316A (ICD-10-CM) - Laceration of right little finger without foreign body with damage to nail      Surgical Procedure/ Date :11/7/19 had flexor tendon repair right small finger   Evaluation Date: 12/10/2019  Insurance:medicaid   Insurance Authorization period Expiration: 1/31/20   Plan of Care Certification Period: 3/30/20     Visit # / Visits Authortized: 6/16   Time In:9:05 am    Time Out:9:55 am    Total Billable Time: 50  minutes          Precautions: HIV precautions       Subjective   Pt states that he sees DR daly 3/12/20  .   They discussed surgery might be an option but not now for 6 months or so.  Next follow up with MD is end of march 2020.     Pt reports: he  Reports that he has been wearing his splint at least 4 times a day for 1 -2 hours .   PT is  compliant with home exercise program given last session.       Response to previous treatment: no change  Functional change:  No functional change noted     Pain: 2/10  Location: right small finger     Objective       David received therapeutic activities for 50  minutes including:    Patient received fluidotherapy to right hand for 10 minutes to increase blood flow, circulation, desensitization, sensory re-education and for pain management.   -STM over scar  -mini-massager over scar  Passive ROM pip flexion and extension       - U/S  3 Mhz @ .8 w/cm2 X 8 minutes ,  continuous 100 duty cycle        -Place and hold in fisting position 15 reps   Passive ROM flexion / extension  10 reps        joint blocking 15 reps pip flexion  And DIP   TGEx  Wave ( as able ) x 10 reps  Reverse PIP kicks x 10 reps     towel walking 2 minutes   Little finger and thumb pinch to  buttons and pom poms 2 trials each   Yellow putty: 2' molding, 30 grasps, 30 scrapes             AROM    2/11/20 3/3/20      right  Right    MP 0/90 0/90   PIP -60/70 -58/70   DIP  0/50 -30/40                  Home Exercises and Education Provided     Education provided:   - none today  - Progress towards goals: Fair     Written Home Exercises Provided: Patient instructed to cont prior HEP. Tendon glides and  Joint blocking   Exercises were reviewed and David was able to demonstrate them prior to the end of the session.  David demonstrated  Fair understanding of the education provided.   .     Assessment   Pt tolerated all activities with no difficulty.  Pt with limited total motion to pip joint, significant flexion contracture. Continue with static progressive splinting and will attempt to adjust to increase extension at Pip joint as able.     Pt would continue to benefit from skilled OT.  Yes      David is progressing poorly  towards his goals and there are no updates to goals at this time. Pt prognosis is Fair.     Pt will continue to benefit from skilled outpatient occupational therapy to address the deficits listed in the problem list on initial evaluation provide pt/family education and to maximize pt's level of independence in the home and community environment.     Anticipated barriers to occupational therapy: increased scarring  Due to not starting therapy til 5 weeks post op flexor tendon repair. Pt with significant flexion contracture to little finger .     Pt's spiritual, cultural and educational needs considered and pt agreeable to plan of care and goals.      GOALS: 6 weeks. Pt agrees with goals set.        Short Term   1)   Patient to be IND with HEP and modalities for pain management,  Met    2)   Increase ROM 15 degrees   For right small finger motion to increase functional hand use for  Right hand , progressing         Long Term (by discharge):  1)   Pt will report 0 out of 10 pain with right hand ., progressing   2)   Patient to score of CJ  on FOTO to demonstrate improved perception of functionalright hand/ arm  Use. Progressing   3)   Pt will return to prior level of function for ADLs and household management, progressing          Plan   Plan of Care Certification Period:  til 3/30/20  continued splinting to try to gain finger extension of Pip joint.   Discussed Plan of Care with patient: Yes  Updates/Grading for next session: continue       Ntia Blanco, OT

## 2020-04-01 ENCOUNTER — TELEPHONE (OUTPATIENT)
Dept: REHABILITATION | Facility: HOSPITAL | Age: 50
End: 2020-04-01

## 2020-04-01 NOTE — TELEPHONE ENCOUNTER
Postponed Appointments    Patient: David Fam  Date: 4/1/2020  Diagnosis: S61.316A (ICD-10-CM) - Laceration of right little finger without foreign body with damage to nail   MRN: [id number]    Called patient to check in to see how patient is doing with his OT home exercise program. Reviewed with patient due to therapy following updates regarding COVID-19 closely and taking every precaution to ensure the safety of our patients, staff and community.  In an abundance of caution and in an effort to help reduce risk and limit community spread, we have decided to temporarily postpone appointments until at least the end of April for patients who may be at increased risk to attend in-person therapy or where therapy is not critically needed at this time. Plan of care and home exercise program were reviewed and patient has what they need to continue therapy at home. All patient questions were answered. Also stated to patient that we are exploring virtual methods of providing care and will be in touch over the next few weeks. Patient reports he is interested in virtual care. Patient verbalized understanding to all.      4/1/2020  ROBERTO Guerrero

## 2020-05-11 ENCOUNTER — CLINICAL SUPPORT (OUTPATIENT)
Dept: REHABILITATION | Facility: HOSPITAL | Age: 50
End: 2020-05-11
Payer: MEDICAID

## 2020-05-11 DIAGNOSIS — S61.316A LACERATION OF RIGHT LITTLE FINGER WITHOUT FOREIGN BODY WITH DAMAGE TO NAIL, INITIAL ENCOUNTER: Primary | ICD-10-CM

## 2020-05-11 PROCEDURE — 97110 THERAPEUTIC EXERCISES: CPT

## 2020-05-11 PROCEDURE — L3913 HFO W/O JOINTS CF: HCPCS

## 2020-05-11 PROCEDURE — 97165 OT EVAL LOW COMPLEX 30 MIN: CPT

## 2020-05-11 NOTE — PLAN OF CARE
[facility] Occupational Therapy  Initial Evaluation     Name: David Fam  Clinic Number: [id number]    Therapy Diagnosis:   Encounter Diagnosis   Name Primary?    Laceration of right little finger without foreign body with damage to nail, initial encounter Yes     Physician: Chris Roberts III*    Physician Orders: eval and treat   Medical Diagnosis: S61.316A (ICD-10-CM) - Laceration of right little finger without foreign body with damage to nail M24.541 (ICD-10-CM) - Contracture of joint of right hand     Surgical Procedure/ Date :tenolysis  To right small finger flexor tendon, 5/7/20  Evaluation Date: 5/11/2020  Insurance:medicaid   Insurance Authorization period Expiration: 5/13/20   Plan of Care Certification Period: 6/30/20    Visit # / Visits Authortized: 1 / 1 per computer   Time In:1:00 pm   Time Out: 2:00 pm   Total Billable Time: 60  minutes    Precautions: tenolysis 5/7/20     Subjective     Involved Side:  right  Dominant Side: Right  Date of Onset: 5/7/20   Mechanism of Injury: Pt initially had laceration of right small finger flexor tendon, he did not start therapy til 6 weeks post of in January 2020. Then had flexion contracture of right small finger and failed conservative treatment with OT. He scheduled tenolyisis of right small finger 5/7/20 in hopes of gaining increased finger pip extension    History of Current Condition: pt in post op dressing today. Will see Md end of the week for follow up 5/15/20.   Stitches and gauze in place  Imaging: none   Previous Therapy: was seen in therapy jan 2020 to march 2020. Then stopped due to covid 19. 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     Current FunctionalStatus   Home/Living environment : lives with an adult       Limitation of Functional Status as follows:   ADLs/IADLs:     - Feeding:Not Necessary    - Bathing:Not Necessary    - Dressing/Grooming: Not Necessary    - Driving: Not Necessary     Leisure: Crafts      Past Medical History/Physical Systems Review:   David Fam  has a past medical history of Anal fissure, HIV infection, and Hypertension.    David Fam  has a past surgical history that includes fissue.    David has a current medication list which includes the following prescription(s): acyclovir, aripiprazole, aspirin, benzoyl peroxide, clonazepam, desonide, doxycycline, efavirenz-emtrictabine-tenofovir 600-200-300 mg, elviteg-cob-emtri-tenof alafen, hydroxyzine hcl, ketoconazole, ketoconazole, loperamide, losartan-hydrochlorothiazide 100-12.5 mg, multivitamin, mupirocin, mytesi, nicotine, olanzapine, risperidone, selenium sulfide, and sulfacetamide sodium-sulfur.    Review of patient's allergies indicates:  No Known Allergies       Objective     Observation/Appearance: pt with stitches in place, adaptic  and gauze, slight bleeding on the gauze.       Edema. Measured in centimeters. Not measure on this date   Hand ROM. Measured in degrees.   5/11/2020 5/11/2020       Right AROM Right PROM                  MP  0/60  0/70                   PIP -35/ 40  -30/45                   DIP -20/30 -15/35                             Sensation unable to test , due to stitches        Strength (Dyanmometer) and Pinch Strength (Pinch Gauge)  Measured in pounds and psi. Average of three trials.  Not tested at this time       CMS Impairment/Limitation/Restriction for FOTO initial  Survey    Therapist  reviewed FOTO scores for David Fam on 5/11/2020.   FOTO documents entered into [facility] - see Media section.                    5/11/2020   L  Category: Self care         Current : CJ = at least 20% but < 40% impaired, limited or restricted  Goal: CJ = at least 20% but < 40% impaired, limited or restricted  Discharge:          Treatment     Treatment Time In: 1:00 pm   Treatment Time Out: 2:00 pm   Total Treatment time separate from Evaluation time:60     Redressed with adaptic gauze and tubi sleeve and   Custom splint : fabricated ulnar gutter hand based safe position splint for postioning when not exercising.      David received therapeutic exercises for 20 minutes including:  -TGE, joint blocking flexion and jt blocking reverse extension , discussed the importance of active motion for flexion and extension to get full benefit from tenolyisis surgery.   Instructed to perform these exercises 10-15 reps 6 times a day .       Home Exercise Program/Education:  Issued HEP:TGE, joint blocking flexion and jt blocking reverse extension , discussed the importance of active motion for flexion and extension to get full benefit from tenolyisis surgery.   Instructed to perform these exercises 10-15 reps 6 times a day      and educated on modality use for pain management . Exercises were reviewed and David was able to demonstrate them prior to the end of the session.   Pt received a written copy of exercises to perform at home. David demonstrated good  understanding of the education provided.  Pt was advised to perform these exercises free of pain, and to stop performing them if pain occurs.    Patient/Family Education: role of OT, goals for OT, scheduling/cancellations - pt verbalized understanding. Discussed insurance limitations with patient.    Additional Education provided: none     Assessment     David Fam is a 50 y.o. male referred to outpatient occupational/hand therapy and presents with a medical diagnosis of tenolysis  of right  small finger , resulting in decrease ROM   and demonstrates limitations as described in the chart below. Following  medical record review it is determined that pt will benefit from occupational therapy services in order to maximize pain free and/or functional use of right hand and functional  .     The patient's rehab potential is Good.     Anticipated barriers to occupational therapy:  None   Pt has no cultural, educational or language barriers to learning provided.    Profile and History Assessment of Occupational Performance Level of Clinical Decision Making Complexity Score   Occupational Profile:   David Fam is a 50 y.o. male who lives with their family and is currently unemployed . David Fam has difficulty with  Dressing, fine motor to manage clothing   phone/computer use  affecting his/her daily functional abilities. His/her main goal for therapy is  Right  .     Comorbidities:    has a past medical history of Anal fissure, HIV infection, and Hypertension.    Medical and Therapy History Review:   Brief               Performance Deficits    Physical:  Joint Mobility    Cognitive:  No Deficits    Psychosocial:    No Deficits     Clinical Decision Making:  low    Assessment Process:  Problem-Focused Assessments    Modification/Need for Assistance:  Not Necessary    Intervention Selection:  Limited Treatment Options       low  Based on PMHX, co morbidities , data from assessments and functional level of assistance required with task and clinical presentation directly impacting function.       The following goals were discussed with the patient and patient is in agreement with them as to be addressed in the treatment plan.          GOALS:  6  weeks. Pt agrees with goals set.  Goals:     Short Term (4  weeks on 6/11/20 ):  1)   Patient to be IND with HEP and modalities for pain management, progressing   2)   Increase ROM 10  degrees  For  Right PIP extension / flexion  motion to increase  functional hand use for  and to hold an item  flat in his hand , progressing   3)   Increase  strength by  5 lbs. to grasp right hand , progressing     Long Term (by discharge):  1)   Pt will report 2 out of 10 pain with right  ., progressing   2)   Patient to score of CJ  on FOTO to demonstrate improved perception of functionalright hand/ arm  Use. Progressing   3)   Pt will return to prior level of function for ADLs and household management, progressing              Plan   Certification Period/Plan of care expiration: 5/11/2020 to  6/30/20 .    Outpatient Occupational Therapy 2 times weekly for 6 weeks may include the following interventions: Manual therapy/joint mobilizations, Therapeutic exercises/activities. and Orthotic Fabrication/Fit/Training.      Nita Blanco, OT

## 2020-05-14 ENCOUNTER — CLINICAL SUPPORT (OUTPATIENT)
Dept: REHABILITATION | Facility: HOSPITAL | Age: 50
End: 2020-05-14
Payer: MEDICAID

## 2020-05-14 DIAGNOSIS — S61.316A LACERATION OF RIGHT LITTLE FINGER WITHOUT FOREIGN BODY WITH DAMAGE TO NAIL, INITIAL ENCOUNTER: Primary | ICD-10-CM

## 2020-05-14 DIAGNOSIS — M24.541 CONTRACTURE OF JOINT OF RIGHT HAND: ICD-10-CM

## 2020-05-14 PROCEDURE — 97110 THERAPEUTIC EXERCISES: CPT

## 2020-05-14 PROCEDURE — 97530 THERAPEUTIC ACTIVITIES: CPT

## 2020-05-14 NOTE — PROGRESS NOTES
"  Occupational Therapy Daily Treatment Note      Date: 5/14/2020  Name: David Fam  Clinic Number: [id number]    Therapy Diagnosis:   Encounter Diagnoses   Name Primary?    Laceration of right little finger without foreign body with damage to nail, initial encounter Yes    Contracture of joint of right hand        Physician: Chris Roberts III*     Physician Orders: eval and treat   Medical Diagnosis: S61.316A (ICD-10-CM) - Laceration of right little finger without foreign body with damage to nail M24.541 (ICD-10-CM) - Contracture of joint of right hand      Surgical Procedure/ Date :tenolysis  To right small finger flexor tendon, 5/7/20  Evaluation Date: 5/11/2020  Insurance:medicaid   Insurance Authorization period Expiration: 5/13/20   Plan of Care Certification Period: 6/30/20  Next MD visit: 5/15/2020     Visit # / Visits Authortized: 2 / submitted for more auth  Time In:10:05 am   Time Out: 10:35 am   Total Billable Time: 30  minutes     Precautions: tenolysis 5/7/20       Subjective     Pt reports: it's not feeling too bad. I've been trying to do the exercises."  he was compliant with home exercise program given last session.   Response to previous treatment: some increase in ROM  Functional change: able to tolerate HEP    Pain: 1/10  Location: right SF    Objective     Edema. Measured in centimeters. Not measure on this date   Hand ROM. Measured in degrees.    5/11/2020 5/11/2020           Right AROM Right PROM                     MP  0/60  0/70                      PIP -35/ 40  -30/45                      DIP -20/30 -15/35                                     Treatment consisted of the following:    Redressed with adaptic gauze and tubi sleeve and   Custom splint : fabricated ulnar gutter hand based safe position splint for postioning when not exercising.       David received the following manual therapy techniques for 5 minutes:   Performed gentle RM to R SF  to decrease edema, improve joint mobility, " decrease pain and improve lymphatic drainage to increase hand function.        David received therapeutic exercises for 25 minutes including:    Gentle PROM of SF IP and composite X 15 reps each   AROM   DIP blocking  PIP blocking   X 15 reps each    Reverse joint blocking X 15 reps   Wave  Hook  straight fist, composite fist finger spreads finger lifts  EDM isolated   X 15 reps each    Light place and hold for SF composite flex X 10 reps          Home Exercises and Education Provided     Education provided: cont HEP and wound care  - Progress towards goals     Written Home Exercises Provided: Patient instructed to cont prior HEP.  Exercises were reviewed and David was able to demonstrate them prior to the end of the session.  David demonstrated good  understanding of the education provided.   .   See EMR under Patient Instructions for exercises provided prior visit.     Assessment     Pt is 1 week post op. Pt tolerated tx well this date with minimal c/o pain. Stitches remain. Pt returns to see MD tomorrow. Pt cont to have significantly limited SF flex and PIP extension. Improved PROM noted. Pt participated well and is motivated.     David is progressing well towards his goals and there are no updates to goals at this time. Pt prognosis continues as Good. Pt will continue to benefit from skilled outpatient occupational therapy to address the deficits listed in the problem list on initial evaluation, provide pt/family education and to maximize pt's level of independence in the home and community environment.     Anticipated barriers to continued occupational therapy: none    Pt's spiritual, cultural and educational needs considered and pt agreeable to plan of care and goals.    Goals     Goals:      Short Term (4  weeks on 6/11/20 ):  1)   Patient to be IND with HEP and modalities for pain management, progressing   2)   Increase ROM 10  degrees  For  Right PIP extension / flexion  motion to increase functional hand  use for  and to hold an item  flat in his hand , progressing   3)   Increase  strength by  5 lbs. to grasp right hand , progressing      Long Term (by discharge):  1)   Pt will report 2 out of 10 pain with right  ., progressing   2)   Patient to score of CJ  on FOTO to demonstrate improved perception of functionalright hand/ arm  Use. Progressing   3)   Pt will return to prior level of function for ADLs and household management, progressing       Plan     Continue skilled occupational therapy with individualized plan of care focusing on ROM to maximize functional use of hand      Updates/Grading for next session: cont to progress as able with ROM    Carol Rodriguez, OT

## 2020-05-19 ENCOUNTER — DOCUMENTATION ONLY (OUTPATIENT)
Dept: REHABILITATION | Facility: HOSPITAL | Age: 50
End: 2020-05-19

## 2020-05-19 NOTE — PROGRESS NOTES
OT Not Seen Note    Date: 5/19/2020    Pt did not show for scheduled OT appointment this date. Pt is scheduled for next OT appointment on 5/21/2020. No charges dropped today.    ROBERTO Guerrero

## 2020-05-21 ENCOUNTER — CLINICAL SUPPORT (OUTPATIENT)
Dept: REHABILITATION | Facility: HOSPITAL | Age: 50
End: 2020-05-21
Payer: MEDICAID

## 2020-05-21 DIAGNOSIS — S61.316A LACERATION OF RIGHT LITTLE FINGER WITHOUT FOREIGN BODY WITH DAMAGE TO NAIL, INITIAL ENCOUNTER: Primary | ICD-10-CM

## 2020-05-21 PROCEDURE — 97110 THERAPEUTIC EXERCISES: CPT

## 2020-05-21 NOTE — PROGRESS NOTES
"  Occupational Therapy Daily Treatment Note      Date: 5/21/2020  Name: David Fam  Clinic Number: [id number]    Therapy Diagnosis:   No diagnosis found.    Physician: Chris Roberts III*     Physician Orders: eval and treat   Medical Diagnosis: S61.316A (ICD-10-CM) - Laceration of right little finger without foreign body with damage to nail M24.541 (ICD-10-CM) - Contracture of joint of right hand      Surgical Procedure/ Date :tenolysis  To right small finger flexor tendon, 5/7/20  Evaluation Date: 5/11/2020  Insurance:medicaid   Insurance Authorization period Expiration: 5/13/20   Plan of Care Certification Period: 6/30/20  Next MD visit: 5/15/2020     Visit # / Visits Authortized:3  / submitted for more auth  Time In:9;35 am   Time Out: 10:16  am   Total Billable Time: 30  minutes     Precautions: tenolysis 5/7/20       Subjective   I saw the doctor he told me that my finger was completely straight after surgery , and that he believes with therapy that the finger will go straight .  He sees the MD if 3 weeks .     Pt reports: it's not feeling too bad. I've been trying to do the exercises."  he was compliant with home exercise program given last session.   Response to previous treatment: some increase in ROM  Functional change: able to tolerate HEP    Pain: 1/10  Location: right SF    Objective     Edema. Measured in centimeters. Not measure on this date     Hand ROM. Measured in degrees.    5/11/2020 5/11/2020           Right AROM Right PROM                     MP  0/60  0/70                      PIP -35/ 40  -30/45                      DIP -20/30 -15/35                                     Treatment consisted of the following:    Redressed with adaptic gauze and tubi sleeve and   Custom splint : fabricated ulnar gutter hand based safe position splint for postioning when not exercising. Instructed to wear that splint til 6/7/20 then discharge that completely      fabricated finger gutter extension splint for " night wear.  5/21/20      moist ehat 10 minutes     David received the following manual therapy techniques for 5 minutes:   Performed gentle RM to R SF  to decrease edema, improve joint mobility, decrease pain and improve lymphatic drainage to increase hand function.        David received therapeutic exercises for 25 minutes including:    Gentle PROM of SF IP and composite X 15 reps each   AROM   DIP blocking  PIP blocking    held off due to bleeding at scar    Reverse joint blocking  held off due to bleeding at scar    Wave  Hook  straight fist, composite fist finger spreads finger lifts  EDM isolated   X 15 reps each    Light place and hold for SF composite flex X 10 reps          Home Exercises and Education Provided     Education provided: cont HEP and wound care  - Progress towards goals     Written Home Exercises Provided: Patient instructed to cont prior HEP.  Exercises were reviewed and David was able to demonstrate them prior to the end of the session.  David demonstrated good  understanding of the education provided.   .   See EMR under Patient Instructions for exercises provided prior visit.     Assessment     Pt is 3  weeks post op. Pt tolerated tx well this date with minimal c/o pain. Pt with increased finger extension on this visit. Pt cont to have significantly limited SF flex and PIP extension. Improved PROM noted. Pt participated well and is motivated.     David is progressing well towards his goals and there are no updates to goals at this time. Pt prognosis continues as Good. Pt will continue to benefit from skilled outpatient occupational therapy to address the deficits listed in the problem list on initial evaluation, provide pt/family education and to maximize pt's level of independence in the home and community environment.     Anticipated barriers to continued occupational therapy: none    Pt's spiritual, cultural and educational needs considered and pt agreeable to plan of care and  goals.    Goals     Goals:      Short Term (4  weeks on 6/11/20 ):  1)   Patient to be IND with HEP and modalities for pain management, progressing   2)   Increase ROM 10  degrees  For  Right PIP extension / flexion  motion to increase functional hand use for  and to hold an item  flat in his hand , progressing   3)   Increase  strength by  5 lbs. to grasp right hand , progressing      Long Term (by discharge):  1)   Pt will report 2 out of 10 pain with right  ., progressing   2)   Patient to score of CJ  on FOTO to demonstrate improved perception of functionalright hand/ arm  Use. Progressing   3)   Pt will return to prior level of function for ADLs and household management, progressing       Plan    5/14/20 to 6/30/20 plan of care   Continue skilled occupational therapy with individualized plan of care focusing on ROM to maximize functional use of hand      Updates/Grading for next session: cont to progress as able with ROM    Nita Blanco OT

## 2020-05-27 ENCOUNTER — CLINICAL SUPPORT (OUTPATIENT)
Dept: REHABILITATION | Facility: HOSPITAL | Age: 50
End: 2020-05-27
Payer: MEDICAID

## 2020-05-27 DIAGNOSIS — M25.60 RANGE OF MOTION DEFICIT: ICD-10-CM

## 2020-05-27 PROCEDURE — 97110 THERAPEUTIC EXERCISES: CPT

## 2020-05-27 NOTE — PROGRESS NOTES
Occupational Therapy Daily Treatment Note      Date: 5/27/2020  Name: David Fam  Clinic Number: [id number]    Therapy Diagnosis:   Encounter Diagnosis   Name Primary?    Range of motion deficit        Physician: Chris Roberts III*     Physician Orders: eval and treat   Medical Diagnosis: S61.316A (ICD-10-CM) - Laceration of right little finger without foreign body with damage to nail M24.541 (ICD-10-CM) - Contracture of joint of right hand      Surgical Procedure/ Date :tenolysis  To right small finger flexor tendon, 5/7/20  Evaluation Date: 5/11/2020  Insurance:medicaid   Insurance Authorization period Expiration: 6/30/2020  Plan of Care Certification Period: 6/30/20  Next MD visit: 6/12/2020     Visit # / Visits Authortized:2/14  Time In:11:45 am   Time Out: 12:15  pm   Total Billable Time: 15  minutes     Precautions: tenolysis 5/7/20     Pt 15 minutes late for appt.    Subjective       Pt reports:   he was compliant with home exercise program given last session.   Response to previous treatment: some increase in ROM  Functional change: able to tolerate HEP    Pain: 1/10  Location: right SF    Objective     Edema. Measured in centimeters. Not measure on this date     Hand ROM. Measured in degrees.    5/11/2020 5/11/2020           Right AROM Right PROM                     MP  0/60  0/70                      PIP -35/ 40  -30/45                      DIP -20/30 -15/35                                      moist heat x 10 minutes     David received the following manual therapy techniques for 5 minutes:   Performed gentle RM to R SF  to decrease edema, improve joint mobility, decrease pain and improve lymphatic drainage to increase hand function.        David received therapeutic exercises for 15 minutes including:    Gentle PROM of SF IP and composite X 15 reps each   AROM   DIP blocking  PIP blocking    X 15 reps  , slight bleeding noted with pip flexion   Reverse joint blocking PIP  X 15 reps     Wave  Hook  straight fist, composite fist finger spreads finger lifts  EDM isolated   X 15 reps each    Light place and hold for SF composite flex X 10 reps   Towel scrunches X 10 reps   Yellow cp with pom poms 3 containers          Home Exercises and Education Provided     Education provided: cont HEP   - Progress towards goals: Good     Written Home Exercises Provided: Patient instructed to cont prior HEP.  Exercises were reviewed and David was able to demonstrate them prior to the end of the session.  aDvid demonstrated good  understanding of the education provided.   .   See EMR under Patient Instructions for exercises provided prior visit.     Assessment     Pt is 3  weeks post op. Pt tolerated tx well this date with minimal c/o pain. Advanced light activities without difficulty.    David is progressing well towards his goals and there are no updates to goals at this time. Pt prognosis continues as Good. Pt will continue to benefit from skilled outpatient occupational therapy to address the deficits listed in the problem list on initial evaluation, provide pt/family education and to maximize pt's level of independence in the home and community environment.     Anticipated barriers to continued occupational therapy: none    Pt's spiritual, cultural and educational needs considered and pt agreeable to plan of care and goals.    Goals     Goals:      Short Term (4  weeks on 6/11/20 ):  1)   Patient to be IND with HEP and modalities for pain management, progressing   2)   Increase ROM 10  degrees  For  Right PIP extension / flexion  motion to increase functional hand use for  and to hold an item  flat in his hand , progressing   3)   Increase  strength by  5 lbs. to grasp right hand , progressing      Long Term (by discharge):  1)   Pt will report 2 out of 10 pain with right  ., progressing   2)   Patient to score of CJ  on FOTO to demonstrate improved perception of functionalright hand/ arm  Use.  Progressing   3)   Pt will return to prior level of function for ADLs and household management, progressing       Plan    5/14/20 to 6/30/20 plan of care   Continue skilled occupational therapy with individualized plan of care focusing on ROM to maximize functional use of hand      Updates/Grading for next session: cont to progress as able with ROM    Yolanda Pena, OT

## 2020-06-01 ENCOUNTER — CLINICAL SUPPORT (OUTPATIENT)
Dept: REHABILITATION | Facility: HOSPITAL | Age: 50
End: 2020-06-01
Payer: MEDICAID

## 2020-06-01 DIAGNOSIS — M25.60 RANGE OF MOTION DEFICIT: Primary | ICD-10-CM

## 2020-06-01 PROCEDURE — 97110 THERAPEUTIC EXERCISES: CPT

## 2020-06-01 NOTE — PROGRESS NOTES
Occupational Therapy Daily Treatment Note      Date: 6/1/2020  Name: David Fam  Clinic Number: [id number]    Therapy Diagnosis:   No diagnosis found.    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    Subjective       Pt reports:   he was compliant with home exercise program given last session.   Response to previous treatment: some increase in ROM  Functional change: able to tolerate HEP    Pain: 1/10  Location: right SF    Objective     Edema. Measured in centimeters. Not measure on this date     Hand ROM. Measured in degrees.    5/11/2020 5/11/2020           Right AROM Right PROM                     MP  0/60  0/70                      PIP -35/ 40  -30/45                      DIP -20/30 -15/35                                      moist heat x 10 minutes     David received the following manual therapy techniques for 5 minutes:   Performed gentle RM to R SF  to decrease edema, improve joint mobility, decrease pain and improve lymphatic drainage to increase hand function.        David received therapeutic exercises for 25 minutes including:    Gentle PROM of SF IP and composite X 15 reps each   AROM   DIP blocking  PIP blocking    X 15 reps  , slight bleeding noted with pip flexion   Reverse joint blocking PIP  X 15 reps    Wave  Hook  straight fist, composite fist finger spreads  finger lifts  EDM isolated   X 15 reps each    Light place and hold for SF composite flex X 10 reps   Towel scrunches X 10 reps   Yellow cp with pom poms 3 containers          Home Exercises and Education Provided     Education provided: cont HEP   - Progress towards goals: Good     Written Home Exercises Provided: Patient instructed to cont prior HEP.  Exercises were reviewed and David was able to demonstrate them prior to the end of the session.  David demonstrated good  understanding of the education provided.   .   See EMR under Patient Instructions for exercises provided prior visit.     Assessment     Pt is 3  weeks post op. Pt tolerated tx well this date with minimal c/o pain. Advanced light activities without difficulty.    David is progressing well towards his goals and there are no updates to goals at this time. Pt prognosis continues as Good. Pt will continue to benefit from skilled outpatient occupational therapy to address the deficits listed in the problem list on initial evaluation, provide pt/family education and to maximize pt's level of independence in the home and community environment.     Anticipated barriers to continued occupational therapy: none    Pt's spiritual, cultural and educational needs considered and pt agreeable to plan of care and goals.    Goals     Goals:      Short Term (4  weeks on 6/11/20 ):  1)   Patient to be IND with HEP and modalities for pain management, progressing   2)   Increase ROM 10  degrees  For  Right PIP extension / flexion  motion to increase functional hand use for  and to hold an item  flat in his hand , progressing   3)   Increase  strength by  5 lbs. to grasp right hand , progressing      Long Term (by discharge):  1)   Pt will report 2 out of 10 pain with right  ., progressing   2)   Patient to score of CJ  on FOTO to demonstrate improved perception of functionalright hand/ arm  Use. Progressing   3)   Pt will return to prior level of function  for ADLs and household management, progressing       Plan    5/14/20 to 6/30/20 plan of care   Continue skilled occupational therapy with individualized plan of care focusing on ROM to maximize functional use of hand      Updates/Grading for next session: cont to progress as able with ROM    Nita Blanco, OT

## 2020-06-04 ENCOUNTER — CLINICAL SUPPORT (OUTPATIENT)
Dept: REHABILITATION | Facility: HOSPITAL | Age: 50
End: 2020-06-04
Payer: MEDICAID

## 2020-06-04 DIAGNOSIS — M25.60 RANGE OF MOTION DEFICIT: Primary | ICD-10-CM

## 2020-06-04 PROCEDURE — 97110 THERAPEUTIC EXERCISES: CPT

## 2020-06-04 NOTE — PROGRESS NOTES
Occupational Therapy Daily Treatment Note      Date: 6/4/2020  Name: David Fam  Clinic Number: [id number]    Therapy Diagnosis:   No diagnosis found.    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    Subjective       Pt reports:   he was compliant with home exercise program given last session.   Response to previous treatment: some increase in ROM  Functional change: able to tolerate HEP    Pain: 1/10  Location: right SF    Objective     Edema. Measured in centimeters. Not measure on this date     Hand ROM. Measured in degrees.    5/11/2020 5/11/2020           Right AROM Right PROM                     MP  0/60  0/70                      PIP -35/ 40  -30/45                      DIP -20/30 -15/35                                      moist heat x 10 minutes     David received the following manual therapy techniques for 5 minutes:   Performed gentle RM to R SF  to decrease edema, improve joint mobility, decrease pain and improve lymphatic drainage to increase hand function.    debrided some dry scabbing on this visit.      David received therapeutic exercises for 25 minutes including:    Gentle PROM of SF IP and composite X 15 reps each   AROM   DIP blocking  PIP blocking    X 15 reps  , slight bleeding noted with pip flexion   Reverse joint blocking PIP  X 15 reps     Wave  Hook  straight fist, composite fist finger spreads finger lifts  EDM isolated   X 15 reps each    Light place and hold for SF composite flex X 10 reps   Towel scrunches X 10 reps   Yellow cp with pom poms 3 containers          Home Exercises and Education Provided     Education provided: cont HEP   - Progress towards goals: Good     Written Home Exercises Provided: Patient instructed to cont prior HEP.  Exercises were reviewed and David was able to demonstrate them prior to the end of the session.  David demonstrated good  understanding of the education provided.   .   See EMR under Patient Instructions for exercises provided prior visit.     Assessment     Pt is 3  weeks post op. Pt tolerated tx well this date with minimal c/o pain. Advanced light activities without difficulty.    David is progressing well towards his goals and there are no updates to goals at this time. Pt prognosis continues as Good. Pt will continue to benefit from skilled outpatient occupational therapy to address the deficits listed in the problem list on initial evaluation, provide pt/family education and to maximize pt's level of independence in the home and community environment.     Anticipated barriers to continued occupational therapy: none    Pt's spiritual, cultural and educational needs considered and pt agreeable to plan of care and goals.    Goals     Goals:      Short Term (4  weeks on 6/11/20 ):  1)   Patient to be IND with HEP and modalities for pain management, progressing   2)   Increase ROM 10  degrees  For  Right PIP extension / flexion  motion to increase functional hand use for  and to hold an item  flat in his hand , progressing   3)   Increase  strength by  5 lbs. to grasp right hand , progressing      Long Term (by discharge):  1)   Pt will report 2 out of 10 pain with right  ., progressing   2)   Patient to score of CJ  on FOTO to demonstrate improved perception of functionalright hand/ arm  Use.  Progressing   3)   Pt will return to prior level of function for ADLs and household management, progressing       Plan    5/14/20 to 6/30/20 plan of care   Continue skilled occupational therapy with individualized plan of care focusing on ROM to maximize functional use of hand      Updates/Grading for next session: cont to progress as able with ROM    Nita Blanco OT

## 2020-06-08 ENCOUNTER — CLINICAL SUPPORT (OUTPATIENT)
Dept: REHABILITATION | Facility: HOSPITAL | Age: 50
End: 2020-06-08
Payer: MEDICAID

## 2020-06-08 PROCEDURE — 97110 THERAPEUTIC EXERCISES: CPT

## 2020-06-08 NOTE — PROGRESS NOTES
Occupational Therapy Daily Treatment Note      Date: 6/8/2020  Name: David Fam  Clinic Number: [id number]    Therapy Diagnosis:   No diagnosis found.    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    Subjective       Pt reports:   he was compliant with home exercise program given last session.   Response to previous treatment: some increase in ROM  Functional change: able to tolerate HEP    Pain: 1/10  Location: right SF    Objective     Edema. Measured in centimeters. Not measure on this date     Hand ROM. Measured in degrees.    5/11/2020 5/11/2020           Right AROM Right PROM                     MP  0/60  0/70                      PIP -35/ 40  -30/45                      DIP -20/30 -15/35                                      moist heat x 10 minutes     David received the following manual therapy techniques for 5 minutes:   Performed gentle RM to R SF  to decrease edema, improve joint mobility, decrease pain and improve lymphatic drainage to increase hand function.    debrided some dry scabbing on this visit.      David received therapeutic exercises for 25 minutes including:    Gentle PROM of SF IP and composite X 15 reps each   AROM   DIP blocking  PIP blocking    X 15 reps  , slight bleeding noted with pip flexion   Reverse joint blocking PIP  X 15 reps     Wave  Hook  straight fist, composite fist finger spreads finger lifts  EDM isolated   X 15 reps each    Light place and hold for SF composite flex X 10 reps   Towel scrunches X 10 reps   Yellow cp with pom poms 3 containers      issued LMB finger extension splint only to be worn 20 minutes , 2 times a day  As able . Discussed with patient I will reach out to MD for order for Dynasplint finger extension splint. If doctor agrees, will progress with orders.        Home Exercises and Education Provided     Education provided: cont HEP   - Progress towards goals: Good     Written Home Exercises Provided: Patient instructed to cont prior HEP.  Exercises were reviewed and David was able to demonstrate them prior to the end of the session.  David demonstrated good  understanding of the education provided.   .   See EMR under Patient Instructions for exercises provided prior visit.     Assessment     Pt is 3  weeks post op. Pt tolerated tx well this date with minimal c/o pain. Advanced light activities without difficulty.    David is progressing well towards his goals and there are no updates to goals at this time. Pt prognosis continues as Good. Pt will continue to benefit from skilled outpatient occupational therapy to address the deficits listed in the problem list on initial evaluation, provide pt/family education and to maximize pt's level of independence in the home and community environment.     Anticipated barriers to continued occupational therapy: none    Pt's spiritual, cultural and educational needs considered and pt agreeable to plan of care and goals.    Goals     Goals:      Short Term (4  weeks on 6/11/20 ):  1)   Patient to be IND with HEP and modalities for pain management, progressing   2)   Increase ROM 10  degrees  For  Right PIP extension / flexion  motion to increase functional hand use for  and to hold an item  flat in his hand , progressing   3)   Increase  strength by  5 lbs. to grasp  right hand , progressing      Long Term (by discharge):  1)   Pt will report 2 out of 10 pain with right  ., progressing   2)   Patient to score of CJ  on FOTO to demonstrate improved perception of functionalright hand/ arm  Use. Progressing   3)   Pt will return to prior level of function for ADLs and household management, progressing       Plan    5/14/20 to 6/30/20 plan of care   Continue skilled occupational therapy with individualized plan of care focusing on ROM to maximize functional use of hand      Updates/Grading for next session: cont to progress as able with ROM    Nita Blanco, OT

## 2020-06-11 ENCOUNTER — CLINICAL SUPPORT (OUTPATIENT)
Dept: REHABILITATION | Facility: HOSPITAL | Age: 50
End: 2020-06-11
Payer: MEDICAID

## 2020-06-11 DIAGNOSIS — M25.60 RANGE OF MOTION DEFICIT: ICD-10-CM

## 2020-06-11 PROCEDURE — 97110 THERAPEUTIC EXERCISES: CPT

## 2020-06-11 NOTE — PROGRESS NOTES
Occupational Therapy Daily Treatment Note      Date: 6/11/2020  Name: David Fam  Clinic Number: [id number]    Therapy Diagnosis:   Encounter Diagnosis   Name Primary?   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   Response to previous treatment: some increase in ROM  Functional change: able to tolerate HEP    Pain: 1/10  Location: right SF    Objective     Edema. Measured in centimeters. Not measure on this date     Hand ROM. Measured in degrees.    5/11/2020 5/11/2020 6/11//20           Right AROM Right PROM                     MP  0/60  0/70  0/75                     PIP -35/ 40  -30/45  -40/50                    DIP -20/30 -15/35 -15/40                                      moist heat x 10 minutes     David received the following manual therapy techniques for 5 minutes:   Performed gentle RM to R SF  to decrease edema, improve joint mobility, decrease pain and improve lymphatic drainage to increase hand function.    debrided some dry scabbing on this visit.      David received therapeutic exercises for 25 minutes including:    Gentle PROM of SF IP and composite X 15 reps each   AROM   DIP blocking  PIP blocking    X 15 reps  , slight bleeding noted with pip flexion   Reverse  joint blocking PIP  X 15 reps    Wave  Hook  straight fist, composite fist finger spreads finger lifts  EDM isolated   X 15 reps each    Light place and hold for SF composite flex X 10 reps   Towel scrunches X 10 reps   Yellow cp with pom poms 3 containers      issued LMB finger extension splint only to be worn 20 minutes , 2 times a day  As able . Discussed with patient I will reach out to MD for order for Dynasplint finger extension splint. If doctor agrees, will progress with orders.        Home Exercises and Education Provided     Education provided: cont HEP   - Progress towards goals: Good     Written Home Exercises Provided: Patient instructed to cont prior HEP.  Exercises were reviewed and David was able to demonstrate them prior to the end of the session.  David demonstrated good  understanding of the education provided.   .   See EMR under Patient Instructions for exercises provided prior visit.     Assessment     . Pt tolerated tx well this date with minimal c/o pain. Advanced light activities without difficulty.  Pt flexion contracture at PIP, pt would benefit from Dynapsplint finger extension splint .     David is progressing well towards his goals and there are no updates to goals at this time. Pt prognosis continues as Good. Pt will continue to benefit from skilled outpatient occupational therapy to address the deficits listed in the problem list on initial evaluation, provide pt/family education and to maximize pt's level of independence in the home and community environment.     Anticipated barriers to continued occupational therapy: none    Pt's spiritual, cultural and educational needs considered and pt agreeable to plan of care and goals.    Goals     Goals:      Short Term (4  weeks on 6/11/20 ):  1)   Patient to be IND with HEP and modalities for pain management, progressing   2)   Increase ROM 10  degrees  For  Right PIP extension / flexion  motion to increase functional hand use for  and  to hold an item  flat in his hand , progressing   3)   Increase  strength by  5 lbs. to grasp right hand , progressing      Long Term (by discharge):  1)   Pt will report 2 out of 10 pain with right  ., progressing   2)   Patient to score of CJ  on FOTO to demonstrate improved perception of functionalright hand/ arm  Use. Progressing   3)   Pt will return to prior level of function for ADLs and household management, progressing       Plan    5/14/20 to 6/30/20 plan of care   Continue skilled occupational therapy with individualized plan of care focusing on ROM to maximize functional use of hand      Updates/Grading for next session: cont to progress as able with ROM    Nita Blanco, OT

## 2020-06-18 ENCOUNTER — CLINICAL SUPPORT (OUTPATIENT)
Dept: REHABILITATION | Facility: HOSPITAL | Age: 50
End: 2020-06-18
Payer: MEDICAID

## 2020-06-18 DIAGNOSIS — M25.60 RANGE OF MOTION DEFICIT: Primary | ICD-10-CM

## 2020-06-18 PROCEDURE — 97110 THERAPEUTIC EXERCISES: CPT

## 2020-06-18 NOTE — PROGRESS NOTES
Occupational Therapy Daily Treatment Note      Date: 6/18/2020  Name: David Fam  Clinic Number: [id number]    Therapy Diagnosis:   No diagnosis found.    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 Pt did not have nay orders in hand but he plans to go get a paper order for Dynasplint form Dr daly office.       he was compliant with home exercise program given last session.   Response to previous treatment: some increase in ROM  Functional change: able to tolerate HEP    Pain: 1/10  Location: right SF    Objective     Edema. Measured in centimeters. Not measure on this date     Hand ROM. Measured in degrees.    5/11/2020 5/11/2020 6/11//20           Right AROM Right PROM                     MP  0/60  0/70  0/75                     PIP -35/ 40  -30/45  -40/50                    DIP -20/30 -15/35 -15/40                                      moist heat x 10 minutes     David received the following manual therapy techniques for 5 minutes:   Performed gentle RM to R SF  to decrease edema, improve joint mobility, decrease pain and improve lymphatic drainage to increase hand function.    debrided some dry scabbing on this visit.      Issued a joint sally to increase pip extension , py instructed to wear 15  minutes 2 times a a day .   David received therapeutic exercises for 25 minutes including:    Gentle PROM of SF IP and composite X 15 reps each   AROM   DIP blocking  PIP blocking    X 15 reps  , slight bleeding noted with pip flexion   Reverse joint blocking PIP  X 15 reps    Wave  Hook  straight fist, composite fist finger spreads finger lifts  EDM isolated   X 15 reps each    Light place and hold for SF composite flex X 10 reps   Towel scrunches X 10 reps   Yellow cp with pom poms 3 containers      issued LMB finger extension splint only to be worn 20 minutes , 2 times a day  As able .   Discussed with patient I will reach out to MD for order for Dynasplint finger extension splint. If doctor agrees, will progress with orders.        Home Exercises and Education Provided     Education provided: cont HEP   - Progress towards goals: Good     Written Home Exercises Provided: Patient instructed to cont prior HEP.  Exercises were reviewed and Matty was able to demonstrate them prior to the end of the session.  Matty demonstrated good  understanding of the education provided.   .   See EMR under Patient Instructions for exercises provided prior visit.     Assessment     . Pt tolerated tx well this date with minimal c/o pain. Advanced light activities without difficulty.  Pt flexion contracture at PIP, pt would benefit from Dynapsplint finger extension splint .     Matty is progressing well towards his goals and there are no updates to goals at this time. Pt prognosis continues as Good. Pt will continue to benefit from skilled outpatient occupational therapy to address the deficits listed in the problem list on initial evaluation, provide pt/family education and to maximize pt's level of independence in the home and community environment.     Anticipated barriers to continued occupational therapy: none    Pt's spiritual, cultural and educational needs considered and pt agreeable to plan of care and goals.    Goals      Goals:      Short Term (4  weeks on 6/11/20 ):  1)   Patient to be IND with HEP and modalities for pain management, progressing   2)   Increase ROM 10  degrees  For  Right PIP extension / flexion  motion to increase functional hand use for  and to hold an item  flat in his hand , progressing   3)   Increase  strength by  5 lbs. to grasp right hand , progressing      Long Term (by discharge):  1)   Pt will report 2 out of 10 pain with right  ., progressing   2)   Patient to score of CJ  on FOTO to demonstrate improved perception of functionalright hand/ arm  Use. Progressing   3)   Pt will return to prior level of function for ADLs and household management, progressing       Plan    5/14/20 to 6/30/20 plan of care   Continue skilled occupational therapy with individualized plan of care focusing on ROM to maximize functional use of hand      Updates/Grading for next session: cont to progress as able with ROM    Nita Blanco, OT

## 2020-06-22 ENCOUNTER — CLINICAL SUPPORT (OUTPATIENT)
Dept: REHABILITATION | Facility: HOSPITAL | Age: 50
End: 2020-06-22
Payer: MEDICAID

## 2020-06-22 DIAGNOSIS — M25.60 RANGE OF MOTION DEFICIT: Primary | ICD-10-CM

## 2020-06-22 PROCEDURE — 97022 WHIRLPOOL THERAPY: CPT

## 2020-06-22 PROCEDURE — 97110 THERAPEUTIC EXERCISES: CPT

## 2020-06-22 NOTE — PROGRESS NOTES
Occupational Therapy Daily Treatment Note      Date: 6/22/2020  Name: David Fam  Clinic Number: [id number]    Therapy Diagnosis:   No diagnosis found.    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    Pt reports: that he went to see DR Daly and he was in agreement to try Tiara splint . Pt did not have nay orders in hand but he plans to go get a paper order for Dynasplint form Dr daly office.       he was compliant with home exercise program given last session.   Response to previous treatment: some increase in ROM  Functional change: able to tolerate HEP    Pain: 1/10  Location: right SF    Objective     Edema. Measured in centimeters. Not measure on this date     Hand ROM. Measured in degrees.    5/11/2020 5/11/2020 6/11//20           Right AROM Right PROM                     MP  0/60  0/70  0/75                     PIP -35/ 40  -30/45  -40/50                    DIP -20/30 -15/35 -15/40                                      moist heat x 10 minutes     David received the following manual therapy techniques for 5 minutes:   Performed gentle RM to R SF  to decrease edema, improve joint mobility, decrease pain and improve lymphatic drainage to increase hand function.    debrided some dry scabbing on this visit.      Issued  a joint sally to increase pip extension , py instructed to wear 15 minutes 2 times a a day .   David received therapeutic exercises for 25 minutes including:    Gentle PROM of SF IP and composite X 15 reps each   AROM   DIP blocking  PIP blocking    X 15 reps  , slight bleeding noted with pip flexion   Reverse joint blocking PIP  X 15 reps    Wave  Hook  straight fist, composite fist finger spreads finger lifts  EDM isolated   X 15 reps each    Light place and hold for SF composite flex X 10 reps   Towel scrunches X 10 reps   Yellow cp with pom poms 3 containers       Faxed orders , face sheet and last OT note to get dynasplint started for right small finger.        Home Exercises and Education Provided     Education provided: cont HEP   - Progress towards goals: Good     Written Home Exercises Provided: Patient instructed to cont prior HEP.  Exercises were reviewed and Matty was able to demonstrate them prior to the end of the session.  Matty demonstrated good  understanding of the education provided.   .   See EMR under Patient Instructions for exercises provided prior visit.     Assessment     . Pt tolerated tx well this date with minimal c/o pain. Advanced light activities without difficulty.  Pt flexion contracture at PIP, pt would benefit from Dynapsplint finger extension splint .     Matty is progressing well towards his goals and there are no updates to goals at this time. Pt prognosis continues as Good. Pt will continue to benefit from skilled outpatient occupational therapy to address the deficits listed in the problem list on initial evaluation, provide pt/family education and to maximize pt's level of independence in the home and community environment.     Anticipated barriers to continued occupational therapy: none    Pt's spiritual, cultural and educational needs considered and pt agreeable to plan of care and goals.    Goals     Goals:      Short Term (4  weeks on 6/11/20 ):  1)   Patient to be  IND with HEP and modalities for pain management, progressing   2)   Increase ROM 10  degrees  For  Right PIP extension / flexion  motion to increase functional hand use for  and to hold an item  flat in his hand , progressing   3)   Increase  strength by  5 lbs. to grasp right hand , progressing      Long Term (by discharge):  1)   Pt will report 2 out of 10 pain with right  ., progressing   2)   Patient to score of CJ  on FOTO to demonstrate improved perception of functionalright hand/ arm  Use. Progressing   3)   Pt will return to prior level of function for ADLs and household management, progressing       Plan    5/14/20 to 6/30/20 plan of care   Continue skilled occupational therapy with individualized plan of care focusing on ROM to maximize functional use of hand      Updates/Grading for next session: cont to progress as able with ROM    Nita Blanco OT

## 2020-06-25 ENCOUNTER — CLINICAL SUPPORT (OUTPATIENT)
Dept: REHABILITATION | Facility: HOSPITAL | Age: 50
End: 2020-06-25
Payer: MEDICAID

## 2020-06-25 DIAGNOSIS — M25.60 RANGE OF MOTION DEFICIT: Primary | ICD-10-CM

## 2020-06-25 PROCEDURE — 97022 WHIRLPOOL THERAPY: CPT

## 2020-06-25 PROCEDURE — 97110 THERAPEUTIC EXERCISES: CPT

## 2020-06-25 NOTE — PROGRESS NOTES
Occupational Therapy Daily Treatment Note      Date: 6/25/2020  Name: David Fam  Clinic Number: [id number]    Therapy Diagnosis:   Encounter Diagnosis   Name Primary?   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    Pt reports: that he went to see DR Daly and he was in agreement to try Tiara splint . Pt did not have nay orders in hand but he plans to go get a paper order for Dynasplint form Dr daly office.       he was compliant with home exercise program given last session.   Response to previous treatment: some increase in ROM  Functional change: able to tolerate HEP    Pain: 1/10  Location: right SF    Objective     Edema. Measured in centimeters. Not measure on this date     Hand ROM. Measured in degrees.    5/11/2020 5/11/2020 6/11//20           Right AROM Right PROM                     MP  0/60  0/70  0/75                     PIP -35/ 40  -30/45  -40/50                    DIP -20/30 -15/35 -15/40                                      moist heat x 10 minutes     David received the following manual therapy techniques for 5 minutes:   Performed gentle RM to R SF  to decrease edema, improve joint mobility, decrease pain and improve lymphatic drainage to increase hand function.     debrided some dry scabbing on this visit.      Issued a joint sally to increase pip extension , py instructed to wear 15 minutes 2 times a a day .   David received therapeutic exercises for 38 minutes including:    Gentle PROM of SF IP and composite X 15 reps each   AROM   DIP blocking  PIP blocking    X 15 reps  , slight bleeding noted with pip flexion   Reverse joint blocking PIP  X 15 reps    Wave  Hook  straight fist, composite fist finger spreads finger lifts  EDM isolated   X 15 reps each    Light place and hold for SF composite flex X 10 reps   Towel scrunches X 10 reps   Yellow cp with pom poms 3 containers       Faxed orders , face sheet and last OT note to get dynasplint started for right small finger.    I called Eric with [employer] splint and  He has all documentation he needs and he states he is processing paperwork and plans to reach out to Patient to discuss cost.     Home Exercises and Education Provided     Education provided: cont HEP   - Progress towards goals: Good     Written Home Exercises Provided: Patient instructed to cont prior HEP.  Exercises were reviewed and Matty was able to demonstrate them prior to the end of the session.  Matty demonstrated good  understanding of the education provided.   .   See EMR under Patient Instructions for exercises provided prior visit.     Assessment     . Pt tolerated tx well this date with minimal c/o pain. Advanced light activities without difficulty.  Pt flexion contracture at PIP, pt would benefit from Dynapsplint finger extension splint .     Matty is progressing well towards his goals and there are no updates to goals at this time. Pt prognosis continues as Good. Pt will continue to benefit from skilled outpatient occupational therapy to address the deficits listed in the problem list on initial evaluation, provide pt/family education and to maximize pt's level of independence in the home and community environment.     Anticipated barriers to  continued occupational therapy: none    Pt's spiritual, cultural and educational needs considered and pt agreeable to plan of care and goals.    Goals     Goals:      Short Term (4  weeks on 6/11/20 ):  1)   Patient to be IND with HEP and modalities for pain management, progressing   2)   Increase ROM 10  degrees  For  Right PIP extension / flexion  motion to increase functional hand use for  and to hold an item  flat in his hand , progressing   3)   Increase  strength by  5 lbs. to grasp right hand , progressing      Long Term (by discharge):  1)   Pt will report 2 out of 10 pain with right  ., progressing   2)   Patient to score of CJ  on FOTO to demonstrate improved perception of functionalright hand/ arm  Use. Progressing   3)   Pt will return to prior level of function for ADLs and household management, progressing       Plan    5/14/20 to 6/30/20 plan of care   Continue skilled occupational therapy with individualized plan of care focusing on ROM to maximize functional use of hand      Updates/Grading for next session: cont to progress as able with ROM    Nita Blanco OT

## 2020-06-30 ENCOUNTER — CLINICAL SUPPORT (OUTPATIENT)
Dept: REHABILITATION | Facility: HOSPITAL | Age: 50
End: 2020-06-30
Payer: MEDICAID

## 2020-06-30 DIAGNOSIS — M25.60 RANGE OF MOTION DEFICIT: Primary | ICD-10-CM

## 2020-06-30 PROCEDURE — 97110 THERAPEUTIC EXERCISES: CPT

## 2020-06-30 NOTE — PROGRESS NOTES
Occupational Therapy Daily Treatment Note      Date: 6/30/2020  Name: David Fam  Clinic Number: [id number]    Therapy Diagnosis:   No diagnosis found.    Physician: Chris Daly III*     Physician Orders: eval and treat   Medical Diagnosis: S61.316A (ICD-10-CM) - Laceration of right little finger without foreign body with damage to nail M24.541 (ICD-10-CM) - Contracture of joint of right hand      Surgical Procedure/ Date :tenolysis  To right small finger flexor tendon, 5/7/20  Evaluation Date: 5/11/2020  Insurance:medicaid   Insurance Authorization period Expiration: 6/30/2020  Plan of Care Certification Period: 6/30/20  Next MD visit: 6/12/2020     Visit # / Visits Authortized:6/ 14  Time In:10 am    Time Out:11:00  am      Total Billable Time:  45 minutes     Precautions: tenolysis 5/7/20         Subjective   Pt states that he forgot to wear his night time extension splint and now his finger is super tight today.     Pt came in hand with written order for Dynasplint which was scanned.    Pt reports: that he went to see DR Daly and he was in agreement to try Tiara splint . Pt did not have nay orders in hand but he plans to go get a paper order for Dynasplint form Dr daly office.       he was compliant with home exercise program given last session.   Response to previous treatment: some increase in ROM  Functional change: able to tolerate HEP    Pain: 1/10  Location: right SF    Objective     Edema. Measured in centimeters. Not measure on this date     Hand ROM. Measured in degrees.    5/11/2020 5/11/2020 6/11//20           Right AROM Right PROM                     MP  0/60  0/70  0/75                     PIP -35/ 40  -30/45  -40/50                    DIP -20/30 -15/35 -15/40                                      moist heat x 10 minutes     David received the following manual therapy techniques for 5 minutes:   Performed gentle RM to R SF  to decrease edema, improve joint mobility, decrease pain and  improve lymphatic drainage to increase hand function.    debrided some dry scabbing on this visit.      Issued a joint sally to increase pip extension , py instructed to wear 15 minutes 2 times a a day .   David received therapeutic exercises for 38 minutes including:    Gentle PROM of SF IP and composite X 15 reps each   AROM   DIP blocking  PIP blocking    X 15 reps  , slight bleeding noted with pip flexion   Reverse joint blocking PIP  X 15 reps    Wave  Hook  straight fist, composite fist finger spreads finger lifts  EDM isolated   X 15 reps each    Light place and hold for SF composite flex X 10 reps   Towel scrunches X 10 reps   Yellow cp with pom poms 3 containers      Eric ( dynasplint ) came into the clinic today and spoke with David, he has all the paperwork he needs , he is just waiting on approval from insurance     Home Exercises and Education Provided     Education provided: cont HEP   - Progress towards goals: Good     Written Home Exercises Provided: Patient instructed to cont prior HEP.  Exercises were reviewed and Matty was able to demonstrate them prior to the end of the session.  Matty demonstrated good  understanding of the education provided.   .   See EMR under Patient Instructions for exercises provided prior visit.     Assessment     . Pt tolerated tx well this date with minimal c/o pain. Advanced light activities without difficulty.  Pt flexion contracture at PIP, pt would benefit from Dynapsplint finger extension splint .     Matty is progressing well towards his goals and there are no updates to goals at this time. Pt prognosis continues as Good. Pt will continue to benefit from skilled outpatient occupational therapy to address the deficits listed in the problem list on initial evaluation, provide pt/family education and to maximize pt's level of independence in the home and community environment.     Anticipated barriers to continued occupational therapy: none    Pt's spiritual,  cultural and educational needs considered and pt agreeable to plan of care and goals.    Goals     Goals:      Short Term (4  weeks on 6/11/20 ):  1)   Patient to be IND with HEP and modalities for pain management, progressing   2)   Increase ROM 10  degrees  For  Right PIP extension / flexion  motion to increase functional hand use for  and to hold an item  flat in his hand , progressing   3)   Increase  strength by  5 lbs. to grasp right hand , progressing      Long Term (by discharge):  1)   Pt will report 2 out of 10 pain with right  ., progressing   2)   Patient to score of CJ  on FOTO to demonstrate improved perception of functionalright hand/ arm  Use. Progressing   3)   Pt will return to prior level of function for ADLs and household management, progressing       Plan    5/14/20 to 6/30/20 plan of care   Continue skilled occupational therapy with individualized plan of care focusing on ROM to maximize functional use of hand      Updates/Grading for next session: cont to progress as able with ROM    Nita Blanco, OT

## 2020-07-06 ENCOUNTER — CLINICAL SUPPORT (OUTPATIENT)
Dept: REHABILITATION | Facility: HOSPITAL | Age: 50
End: 2020-07-06
Payer: MEDICAID

## 2020-07-06 DIAGNOSIS — M25.60 RANGE OF MOTION DEFICIT: Primary | ICD-10-CM

## 2020-07-06 PROCEDURE — 97110 THERAPEUTIC EXERCISES: CPT

## 2020-07-06 NOTE — PROGRESS NOTES
Occupational Therapy Daily Treatment Note      Date: 7/6/2020  Name: David Fam  Clinic Number: [id number]    Therapy Diagnosis:   No diagnosis found.    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   He was told that his insurance has approved dynasplint and he is waiting for rep to come fit him for dynasplint .        Pt reports: he was compliant with home exercise program given last session.   Response to previous treatment: some increase in ROM  Functional change: able to tolerate HEP    Pain: 1/10  Location: right SF    Objective     Edema. Measured in centimeters. Not measure on this date     Hand ROM. Measured in degrees.    5/11/2020 5/11/2020 6/11//20           Right AROM Right PROM                     MP  0/60  0/70  0/75                     PIP -35/ 40  -30/45  -40/50                    DIP -20/30 -15/35 -15/40                                      moist heat x 10 minutes     David received the following manual therapy techniques for 5 minutes:   Performed gentle RM to R SF  to decrease edema, improve joint mobility, decrease pain and improve lymphatic drainage to increase hand function.    debrided some dry scabbing on this visit.      Issued a joint sally to increase pip  extension , py instructed to wear 15 minutes 2 times a a day .   David received therapeutic exercises for 38 minutes including:    Gentle PROM of SF IP and composite X 15 reps each   AROM   DIP blocking  PIP blocking    X 15 reps  , slight bleeding noted with pip flexion   Reverse joint blocking PIP  X 15 reps    Wave  Hook  straight fist, composite fist finger spreads finger lifts  EDM isolated   X 15 reps each    Light place and hold for SF composite flex X 10 reps   Towel scrunches X 10 reps   Yellow cp with pom poms 3 containers      Eric ( dynasplint ) came into the clinic today and spoke with David, he has all the paperwork he needs , he is just waiting on approval from insurance     Home Exercises and Education Provided     Education provided: cont HEP   - Progress towards goals: Good     Written Home Exercises Provided: Patient instructed to cont prior HEP.  Exercises were reviewed and Matty was able to demonstrate them prior to the end of the session.  aMtty demonstrated good  understanding of the education provided.   .   See EMR under Patient Instructions for exercises provided prior visit.     Assessment     . Pt tolerated tx well this date with minimal c/o pain. Advanced light activities without difficulty.  Pt flexion contracture at PIP, pt would benefit from Dynapsplint finger extension splint .     Matty is progressing well towards his goals and there are no updates to goals at this time. Pt prognosis continues as Good. Pt will continue to benefit from skilled outpatient occupational therapy to address the deficits listed in the problem list on initial evaluation, provide pt/family education and to maximize pt's level of independence in the home and community environment.     Anticipated barriers to continued occupational therapy: none    Pt's spiritual, cultural and educational needs considered and pt agreeable to plan of care and goals.    Goals     Goals:      Short Term (4  weeks on  6/11/20 ):  1)   Patient to be IND with HEP and modalities for pain management, progressing   2)   Increase ROM 10  degrees  For  Right PIP extension / flexion  motion to increase functional hand use for  and to hold an item  flat in his hand , progressing   3)   Increase  strength by  5 lbs. to grasp right hand , progressing      Long Term (by discharge):  1)   Pt will report 2 out of 10 pain with right  ., progressing   2)   Patient to score of CJ  on FOTO to demonstrate improved perception of functionalright hand/ arm  Use. Progressing   3)   Pt will return to prior level of function for ADLs and household management, progressing       Plan    treatment plan 7/6/20 to 8/30/20   Continue skilled occupational therapy with individualized plan of care focusing on ROM to maximize functional use of hand      Updates/Grading for next session: cont to progress as able with ROM    Nita Blanco, OT

## 2020-07-09 ENCOUNTER — CLINICAL SUPPORT (OUTPATIENT)
Dept: REHABILITATION | Facility: HOSPITAL | Age: 50
End: 2020-07-09
Payer: MEDICAID

## 2020-07-09 DIAGNOSIS — M25.60 RANGE OF MOTION DEFICIT: Primary | ICD-10-CM

## 2020-07-09 PROCEDURE — 97110 THERAPEUTIC EXERCISES: CPT

## 2020-07-09 NOTE — PROGRESS NOTES
Occupational Therapy Daily Treatment Note      Date: 7/9/2020  Name: David Fam  Clinic Number: [id number]    Therapy Diagnosis:   No diagnosis found.    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   He was told that his insurance has approved dynasplint and he is waiting for rep to come fit him for dynasplint .        Pt reports: he was compliant with home exercise program given last session.   Response to previous treatment: some increase in ROM  Functional change: able to tolerate HEP    Pain: 1/10  Location: right SF    Objective     Edema. Measured in centimeters. Not measure on this date     Hand ROM. Measured in degrees.    5/11/2020 5/11/2020 6/11//20           Right AROM Right PROM                     MP  0/60  0/70  0/75                     PIP -35/ 40  -30/45  -40/50                    DIP -20/30 -15/35 -15/40                                      moist heat x 10 minutes     David received the following manual therapy techniques for 5 minutes:   Performed gentle RM to R SF  to decrease edema, improve joint mobility, decrease pain and improve lymphatic drainage to increase hand function.    debrided some dry scabbing on this visit.      Issued a joint sally to increase pip  extension , py instructed to wear 15 minutes 2 times a a day .   David received therapeutic exercises for 38 minutes including:    Gentle PROM of SF IP and composite X 15 reps each   AROM   DIP blocking  PIP blocking    X 15 reps  , slight bleeding noted with pip flexion   Reverse joint blocking PIP  X 15 reps    Wave  Hook  straight fist, composite fist finger spreads finger lifts  EDM isolated   X 15 reps each    Light place and hold for SF composite flex X 10 reps   Towel scrunches X 10 reps   Yellow cp with pom poms 3 containers      Eric ( dynasplint ) came into the clinic today and spoke with David, he has all the paperwork he needs , he is just waiting on approval from insurance     Home Exercises and Education Provided     Education provided: cont HEP   - Progress towards goals: Good     Written Home Exercises Provided: Patient instructed to cont prior HEP.  Exercises were reviewed and Matty was able to demonstrate them prior to the end of the session.  Matty demonstrated good  understanding of the education provided.   .   See EMR under Patient Instructions for exercises provided prior visit.     Assessment     . Pt tolerated tx well this date with minimal c/o pain. Advanced light activities without difficulty.  Pt flexion contracture at PIP, pt would benefit from Dynapsplint finger extension splint .     Matty is progressing well towards his goals and there are no updates to goals at this time. Pt prognosis continues as Good. Pt will continue to benefit from skilled outpatient occupational therapy to address the deficits listed in the problem list on initial evaluation, provide pt/family education and to maximize pt's level of independence in the home and community environment.     Anticipated barriers to continued occupational therapy: none    Pt's spiritual, cultural and educational needs considered and pt agreeable to plan of care and goals.    Goals     Goals:      Short Term (4  weeks on  6/11/20 ):  1)   Patient to be IND with HEP and modalities for pain management, progressing   2)   Increase ROM 10  degrees  For  Right PIP extension / flexion  motion to increase functional hand use for  and to hold an item  flat in his hand , progressing   3)   Increase  strength by  5 lbs. to grasp right hand , progressing      Long Term (by discharge):  1)   Pt will report 2 out of 10 pain with right  ., progressing   2)   Patient to score of CJ  on FOTO to demonstrate improved perception of functionalright hand/ arm  Use. Progressing   3)   Pt will return to prior level of function for ADLs and household management, progressing       Plan    treatment plan 7/6/20 to 8/30/20   Continue skilled occupational therapy with individualized plan of care focusing on ROM to maximize functional use of hand      Updates/Grading for next session: cont to progress as able with ROM    Nita Blanco, OT

## 2020-07-13 ENCOUNTER — CLINICAL SUPPORT (OUTPATIENT)
Dept: REHABILITATION | Facility: HOSPITAL | Age: 50
End: 2020-07-13
Payer: MEDICAID

## 2020-07-13 DIAGNOSIS — M25.60 RANGE OF MOTION DEFICIT: Primary | ICD-10-CM

## 2020-07-13 PROCEDURE — 97110 THERAPEUTIC EXERCISES: CPT

## 2020-07-13 PROCEDURE — 97022 WHIRLPOOL THERAPY: CPT

## 2020-07-13 NOTE — PROGRESS NOTES
Occupational Therapy Daily Treatment Note      Date: 7/13/2020  Name: David Fam  Clinic Number: [id number]    Therapy Diagnosis:   No diagnosis found.    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   He was told that his insurance has approved dynasplint and he is waiting for rep to come fit him for dynasplint .   He continues to wait for response from toney Reyeslingen rep .       Pt reports: he was compliant with home exercise program given last session.   Response to previous treatment: some increase in ROM  Functional change: able to tolerate HEP    Pain: 1/10  Location: right SF    Objective     Edema. Measured in centimeters. Not measure on this date     Hand ROM. Measured in degrees.    5/11/2020 5/11/2020 6/11//20           Right AROM Right PROM                     MP  0/60  0/70  0/75                     PIP -35/ 40  -30/45  -40/50                    DIP -20/30 -15/35 -15/40                                      moist heat x 10 minutes     David received the following manual therapy techniques for 5 minutes:   Performed gentle RM to R SF  to decrease edema, improve joint mobility, decrease pain and improve lymphatic drainage to increase hand function.    debrided some dry  scabbing on this visit.      Issued a joint sally to increase pip extension , py instructed to wear 15 minutes 2 times a a day .   David received therapeutic exercises for 38 minutes including:    Gentle PROM of SF IP and composite X 15 reps each   AROM   DIP blocking  PIP blocking    X 15 reps  , slight bleeding noted with pip flexion   Reverse joint blocking PIP  X 15 reps    Wave  Hook  straight fist, composite fist finger spreads finger lifts  EDM isolated   X 15 reps each    Light place and hold for SF composite flex X 10 reps   Towel scrunches X 10 reps   Yellow cp with pom poms 3 containers            I contacted Eric, from dynasplint rep to inquire where the splint is. I left message today .         Home Exercises and Education Provided     Education provided: cont HEP   - Progress towards goals: Good     Written Home Exercises Provided: Patient instructed to cont prior HEP.  Exercises were reviewed and Matty was able to demonstrate them prior to the end of the session.  Matty demonstrated good  understanding of the education provided.   .   See EMR under Patient Instructions for exercises provided prior visit.     Assessment     . Pt tolerated tx well this date with minimal c/o pain. Advanced light activities without difficulty.  Pt flexion contracture at PIP, pt would benefit from Dynapsplint finger extension splint .     Matty is progressing well towards his goals and there are no updates to goals at this time. Pt prognosis continues as Good. Pt will continue to benefit from skilled outpatient occupational therapy to address the deficits listed in the problem list on initial evaluation, provide pt/family education and to maximize pt's level of independence in the home and community environment.     Anticipated barriers to continued occupational therapy: none    Pt's spiritual, cultural and educational needs considered and pt agreeable to plan of care and goals.    Goals     Goals:      Short Term (4   weeks on 6/11/20 ):  1)   Patient to be IND with HEP and modalities for pain management, progressing   2)   Increase ROM 10  degrees  For  Right PIP extension / flexion  motion to increase functional hand use for  and to hold an item  flat in his hand , progressing   3)   Increase  strength by  5 lbs. to grasp right hand , progressing      Long Term (by discharge):  1)   Pt will report 2 out of 10 pain with right  ., progressing   2)   Patient to score of CJ  on FOTO to demonstrate improved perception of functionalright hand/ arm  Use. Progressing   3)   Pt will return to prior level of function for ADLs and household management, progressing       Plan    treatment plan 7/6/20 to 8/30/20   Continue skilled occupational therapy with individualized plan of care focusing on ROM to maximize functional use of hand      Updates/Grading for next session: cont to progress as able with ROM    Nita Blanco, OT

## 2020-07-16 ENCOUNTER — CLINICAL SUPPORT (OUTPATIENT)
Dept: REHABILITATION | Facility: HOSPITAL | Age: 50
End: 2020-07-16
Payer: MEDICAID

## 2020-07-16 DIAGNOSIS — M25.60 RANGE OF MOTION DEFICIT: Primary | ICD-10-CM

## 2020-07-16 PROCEDURE — 97110 THERAPEUTIC EXERCISES: CPT

## 2020-07-16 PROCEDURE — 97022 WHIRLPOOL THERAPY: CPT

## 2020-07-16 NOTE — PROGRESS NOTES
Occupational Therapy Daily Treatment Note      Date: 7/16/2020  Name: David Fam  Clinic Number: [id number]    Therapy Diagnosis:   No diagnosis found.    Physician: Chris Roberts III*     Physician Orders: eval and treat   Medical Diagnosis: S61.316A (ICD-10-CM) - Laceration of right little finger without foreign body with damage to nail M24.541 (ICD-10-CM) - Contracture of joint of right hand      Surgical Procedure/ Date :tenolysis  To right small finger flexor tendon, 5/7/20  Evaluation Date: 5/11/2020  Insurance:medicaid   Insurance Authorization period Expiration: 6/30/2020  Plan of Care Certification Period: 6/30/20  Next MD visit: 6/12/2020     Visit # / Visits Authortized:9/14    Time In:10;45  am     Time Out:11;30  am       Total Billable Time:  45 minutes     Precautions: tenolysis 5/7/20         Subjective   Pt states that dynasplint rep came today and fitted him for finger extension split . He was instructed to wear it 30 minutes 6-8 times a.day , he was able to hardy and doff splint today independently.  He was cautioned by dynasplint rep and myself to be cautious with pressure sore spots and if so to decrease time and frequency of the splint .        Pt reports: he was compliant with home exercise program given last session.   Response to previous treatment: some increase in ROM  Functional change: able to tolerate HEP    Pain: 1/10  Location: right SF    Objective     Edema. Measured in centimeters. Not measure on this date     Hand ROM. Measured in degrees.    5/11/2020 5/11/2020 6/11//20           Right AROM Right PROM                     MP  0/60  0/70  0/75                     PIP -35/ 40  -30/45  -40/50                    DIP -20/30 -15/35 -15/40                                      moist heat x 10 minutes     David received the following manual therapy techniques for 5 minutes:   Performed gentle RM to R SF  to decrease edema, improve joint mobility, decrease pain and improve lymphatic  drainage to increase hand function.    debrided some dry scabbing on this visit.      Issued a joint sally to increase pip extension , py instructed to wear 15 minutes 2 times a a day .   David received therapeutic exercises for 38 minutes including:    Gentle PROM of SF IP and composite X 15 reps each   AROM   DIP blocking  PIP blocking    X 15 reps  , slight bleeding noted with pip flexion   Reverse joint blocking PIP  X 15 reps    Wave  Hook  straight fist, composite fist finger spreads finger lifts  EDM isolated   X 15 reps each    Light place and hold for SF composite flex X 10 reps   Towel scrunches X 10 reps   Yellow cp with pom poms 3 containers            Home Exercises and Education Provided     Education provided: cont HEP   - Progress towards goals: Good     Written Home Exercises Provided: Patient instructed to cont prior HEP.  Exercises were reviewed and Matty was able to demonstrate them prior to the end of the session.  Matty demonstrated good  understanding of the education provided.   .   See EMR under Patient Instructions for exercises provided prior visit.     Assessment     . Pt tolerated tx well this date with minimal c/o pain. Advanced light activities without difficulty.  Pt flexion contracture at PIP, pt would benefit from Dynapsplint finger extension splint .     Matty is progressing well towards his goals and there are no updates to goals at this time. Pt prognosis continues as Good. Pt will continue to benefit from skilled outpatient occupational therapy to address the deficits listed in the problem list on initial evaluation, provide pt/family education and to maximize pt's level of independence in the home and community environment.     Anticipated barriers to continued occupational therapy: none    Pt's spiritual, cultural and educational needs considered and pt agreeable to plan of care and goals.    Goals     Goals:      Short Term (4  weeks on 6/11/20 ):  1)   Patient to be IND  with HEP and modalities for pain management, progressing   2)   Increase ROM 10  degrees  For  Right PIP extension / flexion  motion to increase functional hand use for  and to hold an item  flat in his hand , progressing   3)   Increase  strength by  5 lbs. to grasp right hand , progressing      Long Term (by discharge):  1)   Pt will report 2 out of 10 pain with right  ., progressing   2)   Patient to score of CJ  on FOTO to demonstrate improved perception of functionalright hand/ arm  Use. Progressing   3)   Pt will return to prior level of function for ADLs and household management, progressing       Plan    treatment plan 7/6/20 to 8/30/20   Continue skilled occupational therapy with individualized plan of care focusing on ROM to maximize functional use of hand      Updates/Grading for next session: cont to progress as able with ROM    Nita Blanco OT

## 2020-07-23 ENCOUNTER — DOCUMENTATION ONLY (OUTPATIENT)
Dept: REHABILITATION | Facility: HOSPITAL | Age: 50
End: 2020-07-23

## 2020-07-23 NOTE — PROGRESS NOTES
Pt did not attend todays appointment , this makes his second appointment  That he has missed since he was issued the  Finger extension dynasplint.

## 2020-08-31 ENCOUNTER — CLINICAL SUPPORT (OUTPATIENT)
Dept: REHABILITATION | Facility: HOSPITAL | Age: 50
End: 2020-08-31
Payer: MEDICAID

## 2020-08-31 DIAGNOSIS — M24.541 CONTRACTURE OF JOINT OF RIGHT HAND: ICD-10-CM

## 2020-08-31 PROCEDURE — L3933 FO W/O JOINTS CF: HCPCS

## 2020-08-31 PROCEDURE — 97022 WHIRLPOOL THERAPY: CPT

## 2020-08-31 PROCEDURE — 97110 THERAPEUTIC EXERCISES: CPT

## 2020-08-31 NOTE — PROGRESS NOTES
Occupational Therapy Daily Treatment Note      Date: 8/31/2020  Name: David Fam  Clinic Number: [id number]    Therapy Diagnosis:   S61.316A (ICD-10-CM) - Laceration of right little finger without foreign body with damage to nail M24.541 (ICD-10-CM) - Contracture of joint of right hand          Physician: Chris Roberts III*     Physician Orders: eval and treat   Medical Diagnosis: S61.316A (ICD-10-CM) - Laceration of right little finger without foreign body with damage to nail M24.541 (ICD-10-CM) - Contracture of joint of right hand      Surgical Procedure/ Date :tenolysis  To right small finger flexor tendon, 5/7/20  Evaluation Date: 5/11/2020  Insurance:medicaid   Insurance Authorization period Expiration:9/30/20   Plan of Care Certification Period:9/30/20     Visit # / Visits Authortized:8/14  Time In:11:00 am     Time Out:11:45  pm       Total Billable Time:  45 minutes     Precautions: tenolysis 5/7/20         Subjective   Pt states that he has not been to therapy since July 6th,  due to personal issues and vandalism at his home . He reports that he has been trying to wear his dynapslint.    His PIP flexion is now even tighter, pt reports that he went back to see the MD and that he was told he does not know what else he can offer David.     Pt reports: he was compliant with home exercise program given last session.   Response to previous treatment: some increase in ROM  Functional change: able to tolerate HEP    Pain: 0/10   Location: right SF    Objective     Edema. Measured in centimeters. Not measure on this date     Hand ROM. Measured in degrees.    5/11/2020 5/11/2020 6/11//20 8/31/00        Right AROM Right PROM                     MP  0/60  0/70  0/75   0/80                  PIP -35/ 40  -30/45  -40/50  -90/90                  DIP -20/30 -15/35 -15/40   -20/30                                   moist heat x 10 minutes     David received the following manual therapy techniques for 5 minutes:    Performed gentle RM to R SF .      fabricated Serial cast with padding out of orficast  over pip and DIP . To be worn 3 days and to come back on Sept 3 rd to have splint removed , check skin intergrity and possibly fabricate new orficast. Pt instructed not to get splint wet .           Gentle PROM of SF IP and composite X 15 reps each      Pt still has dynasplint which he will keep and we may use again after Serial  casting .   Home Exercises and Education Provided     Education provided: cont HEP   - Progress towards goals: Good     Written Home Exercises Provided: Patient instructed to cont prior HEP.  Exercises were reviewed and Matty was able to demonstrate them prior to the end of the session.  Matty demonstrated good  understanding of the education provided.   .   See EMR under Patient Instructions for exercises provided prior visit.     Assessment      Pt has missed 5 weeks of therapy and returns today with increased flexion contracture of PIP of small finger. With a flexion contracture of 90 degrees noted on this date , which 40 degrees more than he had at last appoint in July 6.2020.   Attempting to make serial cast to decrease flexion contacture at PIP jtRojas Starr is progressing poorly tow ards his goals and there are no updates to goals at this time. Pt prognosis continues as Good. Pt will continue to benefit from skilled outpatient occupational therapy to address the deficits listed in the problem list on initial evaluation, provide pt/family education and to maximize pt's level of independence in the home and community environment.     Anticipated barriers to continued occupational therapy: none    Pt's spiritual, cultural and educational needs considered and pt agreeable to plan of care and goals.    Goals     Goals:      Short Term (4  weeks on 6/11/20 ):  1)   Patient to be IND with HEP and modalities for pain management, progressing   2)   Increase ROM 10  degrees  For  Right PIP  extension / flexion  motion to increase functional hand use for  and to hold an item  flat in his hand , progressing   3)   Increase  strength by  5 lbs. to grasp right hand , progressing      Long Term (by discharge):  1)   Pt will report 2 out of 10 pain with right  ., progressing   2)   Patient to score of CJ  on FOTO to demonstrate improved perception of functionalright hand/ arm  Use. Progressing   3)   Pt will return to prior level of function for ADLs and household management, progressing       Plan    treatment plan 8/31/20 to 9/30/20   Continue skilled occupational therapy with individualized plan of care focusing on ROM to maximize functional use of hand      Updates/Grading for next session: cont to progress as able with ROM    Nita Blanco, OT

## 2020-09-03 ENCOUNTER — CLINICAL SUPPORT (OUTPATIENT)
Dept: REHABILITATION | Facility: HOSPITAL | Age: 50
End: 2020-09-03
Payer: MEDICAID

## 2020-09-03 PROCEDURE — 97110 THERAPEUTIC EXERCISES: CPT

## 2020-09-03 NOTE — PROGRESS NOTES
Occupational Therapy Daily Treatment Note   Date 9/3/2020    Date: 8/31/2020  Name: David Fam  Clinic Number: [id number]     Therapy Diagnosis:   S61.316A (ICD-10-CM) - Laceration of right little finger without foreign body with damage to nail M24.541 (ICD-10-CM) - Contracture of joint of right hand            Physician: Chris Roberts III*     Physician Orders: eval and treat   Medical Diagnosis: S61.316A (ICD-10-CM) - Laceration of right little finger without foreign body with damage to nail M24.541 (ICD-10-CM) - Contracture of joint of right hand      Surgical Procedure/ Date :tenolysis  To right small finger flexor tendon, 5/7/20  Evaluation Date: 5/11/2020  Insurance:medicaid   Insurance Authorization period Expiration:9/30/20   Plan of Care Certification Period:9/30/20      Visit # / Visits Authortized:8/14  Time In:11:00 am     Time Out:11:45  pm       Total Billable Time:  45 minutes     Precautions: tenolysis 5/7/20           Subjective     Pt reports: he was compliant with home exercise program given last session.   Response to previous treatment:able to tolerate seiral casting   Functional change: none noted yet     Pain: 2/10  Location: right small  fingers      Objective       Unwrapped Orficast splint skin check , no increased pressure points noted        Matty received the following supervised modalities after being cleared for contradictions for 10 minutes:   -moist heat       Matty received the following manual therapy techniques for 15  minutes:   -passive stretching right small finger pip extension supporting MP  To avoid hyperextension   fabricated new finger extension splint with orficast, padded directly on skin with foam padding          Home Exercises and Education Provided     Education provided:   - instructed that we will go 5 days with seirial cast to attempt to increased finger extension at pip joint. If at any point pt has increased pressure and he is  unable to get to therapy.     due to holiday that he can wet orifcast to remove splint h  - Progress towards goals     Written Home Exercises Provided: Patient instructed to cont prior HEP.  Exercises were reviewed and Matty was able to demonstrate them prior to the end of the session.  Matty demonstrated good  understanding of the education provided.   .     Assessment     Pt would continue to benefit from skilled OT.  Yes      Matty is progressing well towards his goals and there are no updates to goals at this time. Pt prognosis is Good.     Pt will continue to benefit from skilled outpatient occupational therapy to address the deficits listed in the problem list on initial evaluation provide pt/family education and to maximize pt's level of independence in the home and community environment.     Anticipated barriers to occupational therapy:  None     Pt's spiritual, cultural and educational needs considered and pt agreeable to plan of care and goals.      Goals:      Short Term (4  weeks on 10/30/20 :  1)   Patient to be IND with HEP and modalities for pain management, progressing   2)   Increase ROM 10  degrees  For  Right PIP extension / flexion  motion to increase functional hand use for  and to hold an item  flat in his hand , progressing   3)   Increase  strength by  5 lbs. to grasp right hand , progressing      Long Term (by discharge):  1)   Pt will report 2 out of 10 pain with right  ., progressing   2)   Patient to score of CJ  on FOTO to demonstrate improved perception of functionalright hand/ arm  Use. Progressing   3)   Pt will return to prior level of function for ADLs and household management, progressing         Plan    treatment plan 8/31/20 to 9/30/20   Continue skilled occupational therapy with individualized plan of care focusing on ROM to maximize functional use of hand        Updates/Grading for next session: cont to progress as able with ROM   Discussed Plan of Care with  patient: Yes  Updates/Grading for next session: attempt to progess casting to increase small finger pip extension       Nita Blanco, OT

## 2020-09-10 ENCOUNTER — CLINICAL SUPPORT (OUTPATIENT)
Dept: REHABILITATION | Facility: HOSPITAL | Age: 50
End: 2020-09-10
Payer: MEDICAID

## 2020-09-10 DIAGNOSIS — M24.541 CONTRACTURE OF JOINT OF RIGHT HAND: Primary | ICD-10-CM

## 2020-09-10 PROCEDURE — 97110 THERAPEUTIC EXERCISES: CPT

## 2020-09-10 NOTE — PROGRESS NOTES
Occupational Therapy Daily Treatment Note   Date 9/10/2020    Date: 8/31/2020  Name: David Fam  Clinic Number: [id number]     Therapy Diagnosis:   S61.316A (ICD-10-CM) - Laceration of right little finger without foreign body with damage to nail M24.541 (ICD-10-CM) - Contracture of joint of right hand            Physician: Chris Roberts III*     Physician Orders: eval and treat   Medical Diagnosis: S61.316A (ICD-10-CM) - Laceration of right little finger without foreign body with damage to nail M24.541 (ICD-10-CM) - Contracture of joint of right hand      Surgical Procedure/ Date :tenolysis  To right small finger flexor tendon, 5/7/20  Evaluation Date: 5/11/2020  Insurance:medicaid   Insurance Authorization period Expiration:9/30/20   Plan of Care Certification Period:9/30/20      Visit # / Visits Authortized:9/14   Time In:11:00 am     Time Out:11:45  pm       Total Billable Time:  45 minutes     Precautions: tenolysis 5/7/20           Subjective     Pt reports: he was compliant with home exercise program given last session.   Response to previous treatment:able to tolerate seiral casting   Functional change: none noted yet     Pain: 2/10  Location: right small  fingers      Objective       Unwrapped Orficast splint skin check , no increased pressure points noted        Matty received the following supervised modalities after being cleared for contradictions for 10 minutes:   -moist heat        9/20/10       Right      Index finger       MP 0/70     PIP 80/80     DIP 40/40             Pt brought his dynapsplint again today to try    after heat pt was able to wear dynasplint   Discussed with patient and that he wants to try dynasplint this week, instead of sierial  casting      Matty received the following manual therapy techniques for 15  minutes:   -passive stretching right small finger pip extension supporting MP  To avoid hyperextension   re fit dynasplint on this date.           Home Exercises and Education Provided     Education provided:   -     Written Home Exercises Provided: Patient instructed to cont prior HEP.  Exercises were reviewed and Matty was able to demonstrate them prior to the end of the session.  Matty demonstrated good  understanding of the education provided.   .     Assessment     Pt would continue to benefit from skilled OT.  Yes      Matty is progressing well towards his goals and there are no updates to goals at this time. Pt prognosis is Good.     Pt will continue to benefit from skilled outpatient occupational therapy to address the deficits listed in the problem list on initial evaluation provide pt/family education and to maximize pt's level of independence in the home and community environment.     Anticipated barriers to occupational therapy:  None     Pt's spiritual, cultural and educational needs considered and pt agreeable to plan of care and goals.      Goals:      Short Term (4  weeks on 10/30/20 :  1)   Patient to be IND with HEP and modalities for pain management, progressing   2)   Increase ROM 10  degrees  For  Right PIP extension / flexion  motion to increase functional hand use for  and to hold an item  flat in his hand , progressing   3)   Increase  strength by  5 lbs. to grasp right hand , progressing      Long Term (by discharge):  1)   Pt will report 2 out of 10 pain with right  ., progressing   2)   Patient to score of CJ  on FOTO to demonstrate improved perception of functionalright hand/ arm  Use. Progressing   3)   Pt will return to prior level of function for ADLs and household management, progressing         Plan    treatment plan 8/31/20 to 9/30/20   Continue skilled occupational therapy with individualized plan of care focusing on ROM to maximize functional use of hand        Updates/Grading for next session: cont to progress as able with ROM   Discussed Plan of Care with patient: Yes  Updates/Grading for next  session: attempt to progess casting to increase small finger pip extension       Nita Blanco, OT

## 2020-09-17 ENCOUNTER — CLINICAL SUPPORT (OUTPATIENT)
Dept: REHABILITATION | Facility: HOSPITAL | Age: 50
End: 2020-09-17
Payer: MEDICAID

## 2020-09-17 DIAGNOSIS — M24.541 CONTRACTURE OF JOINT OF RIGHT HAND: Primary | ICD-10-CM

## 2020-09-17 PROCEDURE — 97110 THERAPEUTIC EXERCISES: CPT

## 2020-09-17 NOTE — PROGRESS NOTES
Occupational Therapy Daily Treatment Note   Date 9/17/2020    Date: 8/31/2020  Name: David Fam  Clinic Number: [id number]     Therapy Diagnosis:   S61.316A (ICD-10-CM) - Laceration of right little finger without foreign body with damage to nail M24.541 (ICD-10-CM) - Contracture of joint of right hand            Physician: Chris Roberts III*     Physician Orders: eval and treat   Medical Diagnosis: S61.316A (ICD-10-CM) - Laceration of right little finger without foreign body with damage to nail M24.541 (ICD-10-CM) - Contracture of joint of right hand      Surgical Procedure/ Date :tenolysis  To right small finger flexor tendon, 5/7/20  Evaluation Date: 5/11/2020  Insurance:medicaid   Insurance Authorization period Expiration:9/30/20   Plan of Care Certification Period:9/30/20      Visit # / Visits Authortized:9/14   Time In:11:00 am     Time Out:11:45  pm       Total Billable Time:  45 minutes     Precautions: tenolysis 5/7/20           Subjective     Pt reports: he was compliant with home exercise program given last session.   Response to previous treatment:able to tolerate seiral casting   Functional change: none noted yet     Pain: 2/10  Location: right small  fingers      Objective       Unwrapped Orficast splint skin check , no increased pressure points noted        Matty received the following supervised modalities after being cleared for contradictions for 10 minutes:   -moist heat        9/20/10       Right      Index finger       MP 0/70     PIP 80/80     DIP 40/40             Pt brought his dynapsplint again today to try    after heat pt was able to wear dynasplint   Discussed with patient and that he wants to try dynasplint this week, instead of sierial  casting      Matty received the following manual therapy techniques for 15  minutes:   -passive stretching right small finger pip extension supporting MP  To avoid hyperextension   re fit dynasplint on this date.           Home Exercises and Education Provided     Education provided:   -     Written Home Exercises Provided: Patient instructed to cont prior HEP.  Exercises were reviewed and Mtaty was able to demonstrate them prior to the end of the session.  Matty demonstrated good  understanding of the education provided.   .     Assessment     Pt would continue to benefit from skilled OT.  Yes      Matty is progressing well towards his goals and there are no updates to goals at this time. Pt prognosis is Good.     Pt will continue to benefit from skilled outpatient occupational therapy to address the deficits listed in the problem list on initial evaluation provide pt/family education and to maximize pt's level of independence in the home and community environment.     Anticipated barriers to occupational therapy:  None     Pt's spiritual, cultural and educational needs considered and pt agreeable to plan of care and goals.      Goals:      Short Term (4  weeks on 10/30/20 :  1)   Patient to be IND with HEP and modalities for pain management, progressing   2)   Increase ROM 10  degrees  For  Right PIP extension / flexion  motion to increase functional hand use for  and to hold an item  flat in his hand , progressing   3)   Increase  strength by  5 lbs. to grasp right hand , progressing      Long Term (by discharge):  1)   Pt will report 2 out of 10 pain with right  ., progressing   2)   Patient to score of CJ  on FOTO to demonstrate improved perception of functionalright hand/ arm  Use. Progressing   3)   Pt will return to prior level of function for ADLs and household management, progressing         Plan    treatment plan 8/31/20 to 9/30/20   Continue skilled occupational therapy with individualized plan of care focusing on ROM to maximize functional use of hand        Updates/Grading for next session: cont to progress as able with ROM   Discussed Plan of Care with patient: Yes  Updates/Grading for next  session: attempt to progess casting to increase small finger pip extension       Nita Blanco, OT

## 2020-09-21 ENCOUNTER — CLINICAL SUPPORT (OUTPATIENT)
Dept: REHABILITATION | Facility: HOSPITAL | Age: 50
End: 2020-09-21
Payer: MEDICAID

## 2020-09-21 DIAGNOSIS — M25.60 RANGE OF MOTION DEFICIT: ICD-10-CM

## 2020-09-21 PROCEDURE — 97110 THERAPEUTIC EXERCISES: CPT

## 2020-09-22 NOTE — PROGRESS NOTES
Occupational Therapy Daily Treatment Note   Date 9/21/2020    Name: David Fam  Clinic Number: [id number]     Therapy Diagnosis:   S61.316A (ICD-10-CM) - Laceration of right little finger without foreign body with damage to nail M24.541 (ICD-10-CM) - Contracture of joint of right hand            Physician: Chris Roberts III*     Physician Orders: eval and treat   Medical Diagnosis: S61.316A (ICD-10-CM) - Laceration of right little finger without foreign body with damage to nail M24.541 (ICD-10-CM) - Contracture of joint of right hand      Surgical Procedure/ Date :tenolysis  To right small finger flexor tendon, 5/7/20  Evaluation Date: 5/11/2020  Insurance:medicaid   Insurance Authorization period Expiration:9/30/20   Plan of Care Certification Period:9/30/20      Visit # / Visits Authortized:10/14   Time In:12:00 pm     Time Out: 12:25  pm       Total Billable Time:  25 minutes     Precautions: tenolysis 5/7/20           Subjective     Pt reports: he was compliant with home exercise program given last session.   Response to previous treatment:tolerating dynasplint  Functional change: none noted yet     Pain: 2/10  Location: right small  fingers      Objective       Unwrapped Orficast splint skin check , no increased pressure points noted        Matty received the following supervised modalities after being cleared for contradictions for 10 minutes:   -moist heat        9/20/10       Right      Index finger       MP 0/70     PIP 80/80     DIP 40/40               Discussed with patient dynasplint wear and he reports wanting to cont with dynasplint     Matty received the following manual therapy techniques for 15  minutes:   -passive stretching right small finger pip extension supporting MP  To avoid hyperextension          Home Exercises and Education Provided     Education provided:   -     Written Home Exercises Provided: Patient instructed to cont prior HEP.  Exercises were  reviewed and Matty was able to demonstrate them prior to the end of the session.  Matty demonstrated good  understanding of the education provided.   .     Assessment     Pt would continue to benefit from skilled OT.  Yes. 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 He reports he's been wearing the dynasplint.       Matty is progressing well towards his goals and there are no updates to goals at this time. Pt prognosis is Good.     Pt will continue to benefit from skilled outpatient occupational therapy to address the deficits listed in the problem list on initial evaluation provide pt/family education and to maximize pt's level of independence in the home and community environment.     Anticipated barriers to occupational therapy:  None     Pt's spiritual, cultural and educational needs considered and pt agreeable to plan of care and goals.      Goals:      Short Term (4  weeks on 10/30/20 :  1)   Patient to be IND with HEP and modalities for pain management, progressing   2)   Increase ROM 10  degrees  For  Right PIP extension / flexion  motion to increase functional hand use for  and to hold an item  flat in his hand , progressing   3)   Increase  strength by  5 lbs. to grasp right hand , progressing      Long Term (by discharge):  1)   Pt will report 2 out of 10 pain with right  ., progressing   2)   Patient to score of CJ  on FOTO to demonstrate improved perception of functionalright hand/ arm  Use. Progressing   3)   Pt will return to prior level of function for ADLs and household management, progressing         Plan    treatment plan 8/31/20 to 9/30/20   Continue skilled occupational therapy with individualized plan of care focusing on ROM to maximize functional use of hand        Updates/Grading for next session: cont to progress as able with ROM   Discussed Plan of Care with patient: Yes  Updates/Grading for next session: cont as  tolerated      Carol Rodriguez, OT        Quality 226: Preventive Care And Screening: Tobacco Use: Screening And Cessation Intervention: Patient screened for tobacco use and is an ex/non-smoker Quality 110: Preventive Care And Screening: Influenza Immunization: Influenza Immunization Administered during Influenza season Detail Level: Detailed

## 2020-09-24 ENCOUNTER — CLINICAL SUPPORT (OUTPATIENT)
Dept: REHABILITATION | Facility: HOSPITAL | Age: 50
End: 2020-09-24
Payer: MEDICAID

## 2020-09-24 DIAGNOSIS — M24.541 CONTRACTURE OF JOINT OF RIGHT HAND: Primary | ICD-10-CM

## 2020-09-24 PROCEDURE — 97022 WHIRLPOOL THERAPY: CPT

## 2020-09-24 PROCEDURE — 97110 THERAPEUTIC EXERCISES: CPT

## 2020-09-24 NOTE — PROGRESS NOTES
Occupational Therapy Daily Treatment Note   Date 9/24/2020    Name: David Fam  Clinic Number: [id number]     Therapy Diagnosis:   S61.316A (ICD-10-CM) - Laceration of right little finger without foreign body with damage to nail M24.541 (ICD-10-CM) - Contracture of joint of right hand            Physician: Chris Roberts III*     Physician Orders: eval and treat   Medical Diagnosis: S61.316A (ICD-10-CM) - Laceration of right little finger without foreign body with damage to nail M24.541 (ICD-10-CM) - Contracture of joint of right hand      Surgical Procedure/ Date :tenolysis  To right small finger flexor tendon, 5/7/20  Evaluation Date: 5/11/2020  Insurance:medicaid   Insurance Authorization period Expiration:9/30/20   Plan of Care Certification Period:10/30/20       Visit # / Visits Authortized:11/ /14   Time In:2:30 pm   Time Out:3:15 pm       Total Billable Time:  25 minutes     Precautions: tenolysis 5/7/20           Subjective   Pt reports that he prefers to use the dynasplint finger extension splint over orficast splint.     Pt reports: he was compliant with home exercise program given last session.   Response to previous treatment:tolerating dynasplint  Functional change: none noted yet     Pain: 2/10  Location: right small  fingers      Objective             Matty received the following supervised modalities after being cleared for contradictions for 10 minutes:    fluido        9/20/10       Right      Index finger       MP 0/70     PIP 80/80     DIP 40/40               Discussed with patient dynasplint wear and he reports  He is wanting to cont with dynasplint     Matty received the following manual therapy techniques for 15  minutes:   -passive stretching right small finger pip extension supporting MP  To avoid hyperextension          Home Exercises and Education Provided     Education provided:   -     Written Home Exercises Provided: Patient instructed to cont prior  HEP.  Exercises were reviewed and Matty was able to demonstrate them prior to the end of the session.  Matty demonstrated good  understanding of the education provided.   .     Assessment     Pt would continue to benefit from skilled OT.  Yes. 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 He reports he's been wearing the dynasplint.       Matty is progressing well towards his goals and there are no updates to goals at this time. Pt prognosis is Good.     Pt will continue to benefit from skilled outpatient occupational therapy to address the deficits listed in the problem list on initial evaluation provide pt/family education and to maximize pt's level of independence in the home and community environment.     Anticipated barriers to occupational therapy:  None     Pt's spiritual, cultural and educational needs considered and pt agreeable to plan of care and goals.      Goals:      Short Term (4  weeks on 10/30/20 :  1)   Patient to be IND with HEP and modalities for pain management, progressing   2)   Increase ROM 10  degrees  For  Right PIP extension / flexion  motion to increase functional hand use for  and to hold an item  flat in his hand , progressing   3)   Increase  strength by  5 lbs. to grasp right hand , progressing      Long Term (by discharge):  1)   Pt will report 2 out of 10 pain with right  ., progressing   2)   Patient to score of CJ  on FOTO to demonstrate improved perception of functionalright hand/ arm  Use. Progressing   3)   Pt will return to prior level of function for ADLs and household management, progressing         Plan    treatment plan 8/31/20 to 9/30/20   Continue skilled occupational therapy with individualized plan of care focusing on ROM to maximize functional use of hand        Updates/Grading for next session: cont to progress as able with ROM   Discussed Plan of Care with patient: Yes  Updates/Grading for next  session: cont as tolerated      Nita Blanco, OT

## 2020-09-28 ENCOUNTER — CLINICAL SUPPORT (OUTPATIENT)
Dept: REHABILITATION | Facility: HOSPITAL | Age: 50
End: 2020-09-28
Payer: MEDICAID

## 2020-09-28 DIAGNOSIS — M24.541 CONTRACTURE OF JOINT OF RIGHT HAND: Primary | ICD-10-CM

## 2020-09-28 PROCEDURE — 97022 WHIRLPOOL THERAPY: CPT

## 2020-09-28 PROCEDURE — 97110 THERAPEUTIC EXERCISES: CPT

## 2020-09-28 NOTE — PROGRESS NOTES
Occupational Therapy Daily Treatment Note   Date 9/28/2020    Name: David Fam  Clinic Number: [id number]     Therapy Diagnosis:   S61.316A (ICD-10-CM) - Laceration of right little finger without foreign body with damage to nail M24.541 (ICD-10-CM) - Contracture of joint of right hand            Physician: Chris Roberts III*     Physician Orders: eval and treat   Medical Diagnosis: S61.316A (ICD-10-CM) - Laceration of right little finger without foreign body with damage to nail M24.541 (ICD-10-CM) - Contracture of joint of right hand      Surgical Procedure/ Date :tenolysis  To right small finger flexor tendon, 5/7/20  Evaluation Date: 5/11/2020  Insurance:medicaid   Insurance Authorization period Expiration:9/30/20   Plan of Care Certification Period:10/30/20       Visit # / Visits Authortized:11/ /14   Time In:2:30 pm   Time Out:3:15 pm       Total Billable Time:  25 minutes     Precautions: tenolysis 5/7/20           Subjective   Pt reports that he prefers to use the dynasplint finger extension splint over orficast splint.     Pt reports: he was compliant with home exercise program given last session.   Response to previous treatment:tolerating dynasplint  Functional change: none noted yet     Pain: 2/10  Location: right small  fingers      Objective             Matty received the following supervised modalities after being cleared for contradictions for 10 minutes:    fluido        9/20/10       Right      Index finger       MP 0/70     PIP 80/80     DIP 40/40               Discussed with patient dynasplint wear and he reports  He is wanting to cont with dynasplint     Matty received the following manual therapy techniques for 15  minutes:   -passive stretching right small finger pip extension supporting MP  To avoid hyperextension          Home Exercises and Education Provided     Education provided:   -     Written Home Exercises Provided: Patient instructed to cont prior  HEP.  Exercises were reviewed and Matty was able to demonstrate them prior to the end of the session.  Matty demonstrated good  understanding of the education provided.   .     Assessment     Pt would continue to benefit from skilled OT.  Yes. 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 He reports he's been wearing the dynasplint.       Matty is progressing well towards his goals and there are no updates to goals at this time. Pt prognosis is Good.     Pt will continue to benefit from skilled outpatient occupational therapy to address the deficits listed in the problem list on initial evaluation provide pt/family education and to maximize pt's level of independence in the home and community environment.     Anticipated barriers to occupational therapy:  None     Pt's spiritual, cultural and educational needs considered and pt agreeable to plan of care and goals.      Goals:      Short Term (4  weeks on 10/30/20 :  1)   Patient to be IND with HEP and modalities for pain management, progressing   2)   Increase ROM 10  degrees  For  Right PIP extension / flexion  motion to increase functional hand use for  and to hold an item  flat in his hand , progressing   3)   Increase  strength by  5 lbs. to grasp right hand , progressing      Long Term (by discharge):  1)   Pt will report 2 out of 10 pain with right  ., progressing   2)   Patient to score of CJ  on FOTO to demonstrate improved perception of functionalright hand/ arm  Use. Progressing   3)   Pt will return to prior level of function for ADLs and household management, progressing         Plan    treatment plan 8/31/20 to 9/30/20   Continue skilled occupational therapy with individualized plan of care focusing on ROM to maximize functional use of hand        Updates/Grading for next session: cont to progress as able with ROM   Discussed Plan of Care with patient: Yes  Updates/Grading for next  session: cont as tolerated      Nita Blanco, OT

## 2020-10-01 ENCOUNTER — CLINICAL SUPPORT (OUTPATIENT)
Dept: REHABILITATION | Facility: HOSPITAL | Age: 50
End: 2020-10-01
Payer: MEDICAID

## 2020-10-01 DIAGNOSIS — M25.60 RANGE OF MOTION DEFICIT: ICD-10-CM

## 2020-10-01 PROCEDURE — 97530 THERAPEUTIC ACTIVITIES: CPT

## 2020-10-01 NOTE — PROGRESS NOTES
Occupational Therapy Daily Treatment Note   Date 10/1/2020    Name: David Fam  Clinic Number: [id number]     Therapy Diagnosis:   S61.316A (ICD-10-CM) - Laceration of right little finger without foreign body with damage to nail M24.541 (ICD-10-CM) - Contracture of joint of right hand            Physician: Chris Roberts III*     Physician Orders: eval and treat   Medical Diagnosis: S61.316A (ICD-10-CM) - Laceration of right little finger without foreign body with damage to nail M24.541 (ICD-10-CM) - Contracture of joint of right hand      Surgical Procedure/ Date :tenolysis  To right small finger flexor tendon, 5/7/20  Evaluation Date: 5/11/2020  Insurance:medicaid   Insurance Authorization period Expiration:9/30/20   Plan of Care Certification Period:10/30/20       Visit # / Visits Authortized:1/1  Time In: 10:45 am   Time Out: 11:25 am      Total Billable Time:  40 minutes     Precautions: tenolysis 5/7/20           Subjective   Pt reports that he prefers to use the dynasplint finger extension splint over orficast splint.     Pt reports: he was compliant with home exercise program given last session.   Response to previous treatment:tolerating dynasplint  Functional change: none noted yet     Pain: 2/10  Location: right small  finger      Objective          9/20/20       Right      Index finger       MP 0/70     PIP 80/80     DIP 40/40               Discussed with patient dynasplint wear and he reports  He is wanting to cont with dynasplint     Matty received the following therapeutic activities for 35 minutes:   Patient received fluidotherapy to right hand for 10 minutes to increase blood flow, circulation, desensitization, sensory re-education and for pain management.   -passive stretching right small finger pip extension supporting MP  To avoid hyperextension          Home Exercises and Education Provided     Education provided:   - discussed possible air cast for extension of  fingers with dorsal MP block    Written Home Exercises Provided: Patient instructed to cont prior HEP.  Exercises were reviewed and Matty was able to demonstrate them prior to the end of the session.  Matty demonstrated good  understanding of the education provided.   .     Assessment     Pt would continue to benefit from skilled OT.  Yes. 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 He reports he's been wearing the dynasplint.       Matty is progressing well towards his goals and there are no updates to goals at this time. Pt prognosis is Good.     Pt will continue to benefit from skilled outpatient occupational therapy to address the deficits listed in the problem list on initial evaluation provide pt/family education and to maximize pt's level of independence in the home and community environment.     Anticipated barriers to occupational therapy:  None     Pt's spiritual, cultural and educational needs considered and pt agreeable to plan of care and goals.      Goals:      Short Term (4  weeks on 10/30/20 :  1)   Patient to be IND with HEP and modalities for pain management, progressing   2)   Increase ROM 10  degrees  For  Right PIP extension / flexion  motion to increase functional hand use for  and to hold an item  flat in his hand , progressing   3)   Increase  strength by  5 lbs. to grasp right hand , progressing      Long Term (by discharge):  1)   Pt will report 2 out of 10 pain with right  ., progressing   2)   Patient to score of CJ  on FOTO to demonstrate improved perception of functionalright hand/ arm  Use. Progressing   3)   Pt will return to prior level of function for ADLs and household management, progressing         Plan    treatment plan 9/30/20 to 10/30/20   Continue skilled occupational therapy with individualized plan of care focusing on ROM to maximize functional use of hand        Updates/Grading for next session: cont  to progress as able with ROM   Discussed Plan of Care with patient: Yes  Updates/Grading for next session: cont as tolerated      Yolanda Pena, OT

## 2020-10-05 ENCOUNTER — CLINICAL SUPPORT (OUTPATIENT)
Dept: REHABILITATION | Facility: HOSPITAL | Age: 50
End: 2020-10-05
Payer: MEDICAID

## 2020-10-05 DIAGNOSIS — M25.60 RANGE OF MOTION DEFICIT: Primary | ICD-10-CM

## 2020-10-05 PROCEDURE — 97022 WHIRLPOOL THERAPY: CPT

## 2020-10-05 PROCEDURE — 97110 THERAPEUTIC EXERCISES: CPT

## 2020-10-05 NOTE — PROGRESS NOTES
Occupational Therapy Daily Treatment Note   Date 10/5/2020    Name: David Fam  Clinic Number: [id number]     Therapy Diagnosis:   S61.316A (ICD-10-CM) - Laceration of right little finger without foreign body with damage to nail M24.541 (ICD-10-CM) - Contracture of joint of right hand            Physician: Chris Roberts III*     Physician Orders: eval and treat   Medical Diagnosis: S61.316A (ICD-10-CM) - Laceration of right little finger without foreign body with damage to nail M24.541 (ICD-10-CM) - Contracture of joint of right hand      Surgical Procedure/ Date :tenolysis  To right small finger flexor tendon, 5/7/20  Evaluation Date: 5/11/2020  Insurance:medicaid   Insurance Authorization period Expiration:9/30/20   Plan of Care Certification Period:10/30/20       Visit # / Visits Authortized:1/1  Time In: 10:45 am   Time Out: 11:25 am      Total Billable Time:  40 minutes     Precautions: tenolysis 5/7/20           Subjective   Pt reports that he prefers to use the dynasplint finger extension splint over orficast splint.     Pt reports: he was compliant with home exercise program given last session.   Response to previous treatment:tolerating dynasplint  Functional change: none noted yet     Pain: 2/10  Location: right small  finger      Objective          9/20/20       Right      Index finger       MP 0/70     PIP 80/80     DIP 40/40               Discussed with patient dynasplint wear and he reports  He is wanting to cont with dynasplint     Matty received the following therapeutic activities for 35 minutes:   Patient received fluidotherapy to right hand for 10 minutes to increase blood flow, circulation, desensitization, sensory re-education and for pain management.   -passive stretching right small finger pip extension supporting MP  To avoid hyperextension          Home Exercises and Education Provided     Education provided:   - discussed possible air cast for extension of  fingers with dorsal MP block    Written Home Exercises Provided: Patient instructed to cont prior HEP.  Exercises were reviewed and Matty was able to demonstrate them prior to the end of the session.  Matty demonstrated good  understanding of the education provided.   .     Assessment     Pt would continue to benefit from skilled OT.  Yes. 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 He reports he's been wearing the dynasplint.       Matty is progressing well towards his goals and there are no updates to goals at this time. Pt prognosis is Good.     Pt will continue to benefit from skilled outpatient occupational therapy to address the deficits listed in the problem list on initial evaluation provide pt/family education and to maximize pt's level of independence in the home and community environment.     Anticipated barriers to occupational therapy:  None     Pt's spiritual, cultural and educational needs considered and pt agreeable to plan of care and goals.      Goals:      Short Term (4  weeks on 10/30/20 :  1)   Patient to be IND with HEP and modalities for pain management, progressing   2)   Increase ROM 10  degrees  For  Right PIP extension / flexion  motion to increase functional hand use for  and to hold an item  flat in his hand , progressing   3)   Increase  strength by  5 lbs. to grasp right hand , progressing      Long Term (by discharge):  1)   Pt will report 2 out of 10 pain with right  ., progressing   2)   Patient to score of CJ  on FOTO to demonstrate improved perception of functionalright hand/ arm  Use. Progressing   3)   Pt will return to prior level of function for ADLs and household management, progressing         Plan    treatment plan 9/30/20 to 10/30/20   Continue skilled occupational therapy with individualized plan of care focusing on ROM to maximize functional use of hand        Updates/Grading for next session: cont  to progress as able with ROM   Discussed Plan of Care with patient: Yes  Updates/Grading for next session: cont as tolerated      Nita Blanco, OT

## 2020-10-08 ENCOUNTER — CLINICAL SUPPORT (OUTPATIENT)
Dept: REHABILITATION | Facility: HOSPITAL | Age: 50
End: 2020-10-08
Payer: MEDICAID

## 2020-10-08 DIAGNOSIS — M25.60 RANGE OF MOTION DEFICIT: Primary | ICD-10-CM

## 2020-10-08 PROCEDURE — 97110 THERAPEUTIC EXERCISES: CPT

## 2020-10-08 PROCEDURE — 97022 WHIRLPOOL THERAPY: CPT

## 2020-10-08 NOTE — PROGRESS NOTES
Occupational Therapy Daily Treatment Note   Date 10/8/2020    Name: David Fam  Clinic Number: [id number]     Therapy Diagnosis:   S61.316A (ICD-10-CM) - Laceration of right little finger without foreign body with damage to nail M24.541 (ICD-10-CM) - Contracture of joint of right hand            Physician: Chris Roberts III*     Physician Orders: eval and treat   Medical Diagnosis: S61.316A (ICD-10-CM) - Laceration of right little finger without foreign body with damage to nail M24.541 (ICD-10-CM) - Contracture of joint of right hand      Surgical Procedure/ Date :tenolysis  To right small finger flexor tendon, 5/7/20  Evaluation Date: 5/11/2020  Insurance:medicaid   Insurance Authorization period Expiration:9/30/20   Plan of Care Certification Period:10/30/20       Visit # / Visits Authortized:1/1  Time In: 11:30 am    Time Out: 12:15 pm       Total Billable Time:  40 minutes     Precautions: tenolysis 5/7/20           Subjective   Pt reports that he prefers to use the dynasplint finger extension splint over orficast splint.   He is scheduled to see dr Roberts 10/25/20 and he does  Not feel that he has had much change with finger flexion contracture.      Pt reports: he was compliant with home exercise program given last session.   Response to previous treatment:tolerating dynasplint  Functional change: none noted yet     Pain: 2/10  Location: right small  finger      Objective          9/20/20  10/8/20      Right      Index finger       MP 0/70 0/80    PIP 80/80 80/80    DIP 40/40 40/35              Discussed with patient dynasplint wear and he reports  He is wanting to cont with dynasplint. He thinks that when he has that on that he      Matty received the following therapeutic activities for 35 minutes:   Patient received fluidotherapy to right hand for 10 minutes to increase blood flow, circulation, desensitization, sensory re-education and for pain management.   -passive  stretching right small finger pip extension supporting MP  To avoid hyperextension          Home Exercises and Education Provided     Education provided:   - discussed possible air cast for extension of fingers with dorsal MP block    Written Home Exercises Provided: Patient instructed to cont prior HEP.  Exercises were reviewed and Matty was able to demonstrate them prior to the end of the session.  Matty demonstrated good  understanding of the education provided.   .     Assessment     Pt would continue to benefit from skilled OT.  Yes. 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 He reports he's been wearing the dynasplint.       Matty is progressing well towards his goals and there are no updates to goals at this time. Pt prognosis is Good.     Pt will continue to benefit from skilled outpatient occupational therapy to address the deficits listed in the problem list on initial evaluation provide pt/family education and to maximize pt's level of independence in the home and community environment.     Anticipated barriers to occupational therapy:  None     Pt's spiritual, cultural and educational needs considered and pt agreeable to plan of care and goals.      Goals:      Short Term (4  weeks on 10/30/20 :  1)   Patient to be IND with HEP and modalities for pain management, progressing   2)   Increase ROM 10  degrees  For  Right PIP extension / flexion  motion to increase functional hand use for  and to hold an item  flat in his hand , progressing   3)   Increase  strength by  5 lbs. to grasp right hand , progressing      Long Term (by discharge):  1)   Pt will report 2 out of 10 pain with right  ., progressing   2)   Patient to score of CJ  on FOTO to demonstrate improved perception of functionalright hand/ arm  Use. Progressing   3)   Pt will return to prior level of function for ADLs and household management, progressing         Plan     treatment plan 9/30/20 to 10/30/20   Continue skilled occupational therapy with individualized plan of care focusing on ROM to maximize functional use of hand        Updates/Grading for next session: cont to progress as able with ROM   Discussed Plan of Care with patient: Yes  Updates/Grading for next session: cont as tolerated      Nita Blanco, OT

## 2020-10-12 ENCOUNTER — CLINICAL SUPPORT (OUTPATIENT)
Dept: REHABILITATION | Facility: HOSPITAL | Age: 50
End: 2020-10-12
Payer: MEDICAID

## 2020-10-12 DIAGNOSIS — M25.60 RANGE OF MOTION DEFICIT: Primary | ICD-10-CM

## 2020-10-12 PROCEDURE — 97110 THERAPEUTIC EXERCISES: CPT

## 2020-10-12 NOTE — PROGRESS NOTES
Occupational Therapy Daily Treatment Note   Date 10/12/2020    Name: David Fam  Clinic Number: [id number]     Therapy Diagnosis:   S61.316A (ICD-10-CM) - Laceration of right little finger without foreign body with damage to nail M24.541 (ICD-10-CM) - Contracture of joint of right hand            Physician: Chris Roberts III*     Physician Orders: eval and treat   Medical Diagnosis: S61.316A (ICD-10-CM) - Laceration of right little finger without foreign body with damage to nail M24.541 (ICD-10-CM) - Contracture of joint of right hand      Surgical Procedure/ Date :tenolysis  To right small finger flexor tendon, 5/7/20  Evaluation Date: 5/11/2020  Insurance:medicaid   Insurance Authorization period Expiration:9/30/20   Plan of Care Certification Period:10/30/20       Visit # / Visits Authortized:1/1  Time In: 11:45 am    Time Out: 12:15 pm       Total Billable Time:  40 minutes     Precautions: tenolysis 5/7/20           Subjective   Pt reports that he prefers to use the dynasplint finger extension splint over orficast splint.   He is scheduled to see dr Roberts 10/16/ 20 and he does  Not feel that he has had much change with finger flexion contracture.      Pt reports: he was compliant with home exercise program given last session.   Response to previous treatment:tolerating dynasplint  Functional change: none noted yet     Pain: 2/10  Location: right small  finger      Objective          9/20/20  10/8/20  10/12/20    Right      Index finger       MP 0/70 0/80 0/85   PIP -80/80 -80/80 -/80/85   DIP -40/40 -40/35 -40/50             Discussed with patient dynasplint wear and he reports  He is wanting to cont with dynasplint. He thinks that when he has that on that he      Matty received the following therapeutic activities for 35 minutes:   Patient received fluidotherapy to right hand for 10 minutes to increase blood flow, circulation, desensitization, sensory re-education and for  pain management.   -passive stretching right small finger pip extension supporting MP  To avoid hyperextension          Home Exercises and Education Provided     Education provided:   - discussed possible air cast for extension of fingers with dorsal MP block    Written Home Exercises Provided: Patient instructed to cont prior HEP.  Exercises were reviewed and Matty was able to demonstrate them prior to the end of the session.  Matty demonstrated good  understanding of the education provided.   .     Assessment     Pt has reached maximum benefit of OT at this time,we have tried aggressive stretching , serial casting and dynasplint   splint for home use. Pt has not made any further progress and has a flexion contracture.  Pt is seeing Dr daly 10/16/20.      Anticipated barriers to occupational therapy:  None     Pt's spiritual, cultural and educational needs considered and pt agreeable to plan of care and goals.      Goals:      Short Term (4  weeks on 10/30/20 :  1)   Patient to be IND with HEP and modalities for pain management, progressing   2)   Increase ROM 10  degrees  For  Right PIP extension / flexion  motion to increase functional hand use for  and to hold an item  flat in his hand , progressing   3)   Increase  strength by  5 lbs. to grasp right hand , progressing      Long Term (by discharge):  1)   Pt will report 2 out of 10 pain with right  ., progressing   2)   Patient to score of CJ  on FOTO to demonstrate improved perception of functionalright hand/ arm  Use. Progressing   3)   Pt will return to prior level of function for ADLs and household management, progressing         Plan    treatment plan 9/30/20 to 10/30/20   Continue skilled occupational therapy with individualized plan of care focusing on ROM to maximize functional use of hand        Updates/Grading for next session:cont ROM    Discussed Plan of Care with patient: Yes  Updates/Grading for next session: cont as  tolerated      Nita Blanco, OT

## 2020-10-15 ENCOUNTER — CLINICAL SUPPORT (OUTPATIENT)
Dept: REHABILITATION | Facility: HOSPITAL | Age: 50
End: 2020-10-15
Payer: MEDICAID

## 2020-10-15 DIAGNOSIS — M25.60 RANGE OF MOTION DEFICIT: Primary | ICD-10-CM

## 2020-10-15 PROCEDURE — 97110 THERAPEUTIC EXERCISES: CPT

## 2020-10-15 NOTE — PROGRESS NOTES
Occupational Therapy Daily Treatment Note   Date 10/15/2020    Name: David Fam  Clinic Number: [id number]     Therapy Diagnosis:   S61.316A (ICD-10-CM) - Laceration of right little finger without foreign body with damage to nail M24.541 (ICD-10-CM) - Contracture of joint of right hand            Physician: Chris Roberts III*     Physician Orders: eval and treat   Medical Diagnosis: S61.316A (ICD-10-CM) - Laceration of right little finger without foreign body with damage to nail M24.541 (ICD-10-CM) - Contracture of joint of right hand      Surgical Procedure/ Date :tenolysis  To right small finger flexor tendon, 5/7/20  Evaluation Date: 5/11/2020  Insurance:medicaid   Insurance Authorization period Expiration:9/30/20   Plan of Care Certification Period:10/30/20       Visit # / Visits Authortized:16/1  Time In: 11:30 am    Time Out: 12:15 pm       Total Billable Time:  40 minutes     Precautions: tenolysis 5/7/20           Subjective   Pt reports that he prefers to use the dynasplint finger extension splint over orficast splint.   He is scheduled to see dr Roberts 10/16/ 20 and he does  Not feel that he has had much change with finger flexion contracture. He plans to talk with DR. Roberts to see what other options are available , he is aware that he has reached maximum       Pt reports: he was compliant with home exercise program given last session.   Response to previous treatment:tolerating dynasplint  Functional change: none noted yet     Pain: 2/10  Location: right small  finger      Objective          9/20/20  10/8/20  10/12/20    Right      Index finger       MP 0/70 0/80 0/85   PIP -80/80 -80/80 -/80/85   DIP -40/40 -40/35 -40/50             Discussed with patient dynasplint wear and he reports  He is wanting to cont with dynasplint. He thinks that when he has that on that he      Matty received the following therapeutic activities for 35 minutes:   Patient received  fluidotherapy to right hand for 10 minutes to increase blood flow, circulation, desensitization, sensory re-education and for pain management.   -passive stretching right small finger pip extension supporting MP  To avoid hyperextension          Home Exercises and Education Provided     Education provided:   - discussed possible air cast for extension of fingers with dorsal MP block    Written Home Exercises Provided: Patient instructed to cont prior HEP.  Exercises were reviewed and Matty was able to demonstrate them prior to the end of the session.  Matty demonstrated good  understanding of the education provided.   .     Assessment     Pt has reached maximum benefit of OT at this time,we have tried aggressive stretching , serial casting and dynasplint   splint for home use. Pt has not made any further progress and has a flexion contracture.  Pt is seeing Dr roberts 10/16/20.      Anticipated barriers to occupational therapy:  None     Pt's spiritual, cultural and educational needs considered and pt agreeable to plan of care and goals.      Goals:      Short Term (4  weeks on 10/30/20 :  1)   Patient to be IND with HEP and modalities for pain management, progressing   2)   Increase ROM 10  degrees  For  Right PIP extension / flexion  motion to increase functional hand use for  and to hold an item  flat in his hand , progressing   3)   Increase  strength by  5 lbs. to grasp right hand , progressing      Long Term (by discharge):  1)   Pt will report 2 out of 10 pain with right  ., progressing   2)   Patient to score of CJ  on FOTO to demonstrate improved perception of functionalright hand/ arm  Use. Progressing   3)   Pt will return to prior level of function for ADLs and household management, progressing         Plan    treatment plan 9/30/20 to 10/30/20   Continue skilled occupational therapy with individualized plan of care focusing on ROM to maximize functional use of hand   Pt see Dr. Roberts  ,  we will see what he recommends      Updates/Grading for next session:cont ROM    Discussed Plan of Care with patient: Yes  Updates/Grading for next session: cont as tolerated      Nita Blanco, OT

## 2021-04-16 ENCOUNTER — PATIENT MESSAGE (OUTPATIENT)
Dept: RESEARCH | Facility: HOSPITAL | Age: 51
End: 2021-04-16

## 2022-11-23 ENCOUNTER — PATIENT MESSAGE (OUTPATIENT)
Dept: ADMINISTRATIVE | Facility: OTHER | Age: 52
End: 2022-11-23
Payer: MEDICAID

## 2022-12-23 ENCOUNTER — PATIENT MESSAGE (OUTPATIENT)
Dept: ADMINISTRATIVE | Facility: OTHER | Age: 52
End: 2022-12-23
Payer: MEDICAID

## 2022-12-28 ENCOUNTER — PATIENT MESSAGE (OUTPATIENT)
Dept: ADMINISTRATIVE | Facility: OTHER | Age: 52
End: 2022-12-28
Payer: MEDICAID

## 2023-01-05 ENCOUNTER — PATIENT MESSAGE (OUTPATIENT)
Dept: ADMINISTRATIVE | Facility: OTHER | Age: 53
End: 2023-01-05
Payer: MEDICAID

## 2023-03-29 ENCOUNTER — TELEPHONE (OUTPATIENT)
Dept: OPTOMETRY | Facility: CLINIC | Age: 53
End: 2023-03-29
Payer: MEDICAID

## 2023-03-30 ENCOUNTER — TELEPHONE (OUTPATIENT)
Dept: OPTOMETRY | Facility: CLINIC | Age: 53
End: 2023-03-30
Payer: MEDICAID

## 2023-03-30 NOTE — TELEPHONE ENCOUNTER
----- Message from Iliana Matamoros sent at 3/30/2023 12:30 PM CDT -----  Contact: David @[phone]  Pt returning phone call regarding r/s his appt

## 2023-04-12 ENCOUNTER — OFFICE VISIT (OUTPATIENT)
Dept: OPTOMETRY | Facility: CLINIC | Age: 53
End: 2023-04-12
Payer: MEDICAID

## 2023-04-12 DIAGNOSIS — H53.8 BLURRED VISION, BILATERAL: Primary | ICD-10-CM

## 2023-04-12 DIAGNOSIS — Z01.00 COMPLETE EYE EXAM, ENCOUNTER FOR: ICD-10-CM

## 2023-04-12 DIAGNOSIS — H52.03 HYPEROPIA OF BOTH EYES WITH REGULAR ASTIGMATISM AND PRESBYOPIA: ICD-10-CM

## 2023-04-12 DIAGNOSIS — H52.223 HYPEROPIA OF BOTH EYES WITH REGULAR ASTIGMATISM AND PRESBYOPIA: ICD-10-CM

## 2023-04-12 DIAGNOSIS — H52.4 HYPEROPIA OF BOTH EYES WITH REGULAR ASTIGMATISM AND PRESBYOPIA: ICD-10-CM

## 2023-04-12 PROCEDURE — 99204 PR OFFICE/OUTPT VISIT, NEW, LEVL IV, 45-59 MIN: ICD-10-PCS | Mod: S$PBB,,, | Performed by: OPTOMETRIST

## 2023-04-12 PROCEDURE — 99999 PR PBB SHADOW E&M-EST. PATIENT-LVL III: CPT | Mod: PBBFAC,,, | Performed by: OPTOMETRIST

## 2023-04-12 PROCEDURE — 1159F MED LIST DOCD IN RCRD: CPT | Mod: CPTII,,, | Performed by: OPTOMETRIST

## 2023-04-12 PROCEDURE — 92015 DETERMINE REFRACTIVE STATE: CPT | Mod: ,,, | Performed by: OPTOMETRIST

## 2023-04-12 PROCEDURE — 99204 OFFICE O/P NEW MOD 45 MIN: CPT | Mod: S$PBB,,, | Performed by: OPTOMETRIST

## 2023-04-12 PROCEDURE — 99213 OFFICE O/P EST LOW 20 MIN: CPT | Mod: PBBFAC | Performed by: OPTOMETRIST

## 2023-04-12 PROCEDURE — 92015 PR REFRACTION: ICD-10-PCS | Mod: ,,, | Performed by: OPTOMETRIST

## 2023-04-12 PROCEDURE — 99999 PR PBB SHADOW E&M-EST. PATIENT-LVL III: ICD-10-PCS | Mod: PBBFAC,,, | Performed by: OPTOMETRIST

## 2023-04-12 PROCEDURE — 1159F PR MEDICATION LIST DOCUMENTED IN MEDICAL RECORD: ICD-10-PCS | Mod: CPTII,,, | Performed by: OPTOMETRIST

## 2023-04-12 NOTE — PROGRESS NOTES
HPI    Annual eye exam    First Time Patient    53 y.o. is here for annual eye exam  Pt states last eye exam was 30 years ago  Pt wear otc readers +2.00  Pt want to get an rx for glasses    (-)Flashes (-)Floaters  (-)Itch, (-)tear, (-)burn, (-)Dryness.  (-) OTC Drops  (-)Photophobia(-)Glare   (-) Headaches (-) Eye Pain (-) Double vision    Eye Medications: None    Past Ocular history   Pt had RK Sx 28 years ago    No Family Ocular history     Last edited by Gilles Pate MA on 4/12/2023  8:39 AM.            Assessment /Plan     For exam results, see Encounter Report.    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 ED PT ON ALL EXAM FINDINGS  RX FINAL SPECS; OK TO CONTINUE WITH HABITUAL SPECS AT NEAR   OCULAR HEALTH WNL OD, OS   RTC 1 YR//PRN FOR REE/DFE

## 2023-07-13 ENCOUNTER — OFFICE VISIT (OUTPATIENT)
Dept: UROLOGY | Facility: CLINIC | Age: 53
End: 2023-07-13
Attending: UROLOGY
Payer: MEDICAID

## 2023-07-13 VITALS
DIASTOLIC BLOOD PRESSURE: 69 MMHG | WEIGHT: 254.31 LBS | OXYGEN SATURATION: 96 % | SYSTOLIC BLOOD PRESSURE: 118 MMHG | HEIGHT: 71 IN | HEART RATE: 82 BPM | BODY MASS INDEX: 35.6 KG/M2

## 2023-07-13 DIAGNOSIS — S37.39XA INJURY OF PENILE URETHRA: Primary | ICD-10-CM

## 2023-07-13 PROCEDURE — 3008F PR BODY MASS INDEX (BMI) DOCUMENTED: ICD-10-PCS | Mod: CPTII,S$GLB,, | Performed by: UROLOGY

## 2023-07-13 PROCEDURE — 1160F PR REVIEW ALL MEDS BY PRESCRIBER/CLIN PHARMACIST DOCUMENTED: ICD-10-PCS | Mod: CPTII,S$GLB,, | Performed by: UROLOGY

## 2023-07-13 PROCEDURE — 99204 OFFICE O/P NEW MOD 45 MIN: CPT | Mod: S$GLB,,, | Performed by: UROLOGY

## 2023-07-13 PROCEDURE — 1159F PR MEDICATION LIST DOCUMENTED IN MEDICAL RECORD: ICD-10-PCS | Mod: CPTII,S$GLB,, | Performed by: UROLOGY

## 2023-07-13 PROCEDURE — 1159F MED LIST DOCD IN RCRD: CPT | Mod: CPTII,S$GLB,, | Performed by: UROLOGY

## 2023-07-13 PROCEDURE — 3074F SYST BP LT 130 MM HG: CPT | Mod: CPTII,S$GLB,, | Performed by: UROLOGY

## 2023-07-13 PROCEDURE — 3008F BODY MASS INDEX DOCD: CPT | Mod: CPTII,S$GLB,, | Performed by: UROLOGY

## 2023-07-13 PROCEDURE — 3074F PR MOST RECENT SYSTOLIC BLOOD PRESSURE < 130 MM HG: ICD-10-PCS | Mod: CPTII,S$GLB,, | Performed by: UROLOGY

## 2023-07-13 PROCEDURE — 99204 PR OFFICE/OUTPT VISIT, NEW, LEVL IV, 45-59 MIN: ICD-10-PCS | Mod: S$GLB,,, | Performed by: UROLOGY

## 2023-07-13 PROCEDURE — 3078F DIAST BP <80 MM HG: CPT | Mod: CPTII,S$GLB,, | Performed by: UROLOGY

## 2023-07-13 PROCEDURE — 3078F PR MOST RECENT DIASTOLIC BLOOD PRESSURE < 80 MM HG: ICD-10-PCS | Mod: CPTII,S$GLB,, | Performed by: UROLOGY

## 2023-07-13 PROCEDURE — 1160F RVW MEDS BY RX/DR IN RCRD: CPT | Mod: CPTII,S$GLB,, | Performed by: UROLOGY

## 2023-07-13 NOTE — PROGRESS NOTES
"Subjective:      David Fam is a 53 y.o. male who was self-referred for evaluation of torn frenulum.      He had injury to frenulum at time of piercing ([facility]) last August. It was recognized at that time and piercing was removed. He had previously place PA that remains patent. He has split stream when he voids. He does have some change in penile sensation after the injury and some burning there w/ urination.    The following portions of the patient's history were reviewed and updated as appropriate: allergies, current medications, past family history, past medical history, past social history, past surgical history and problem list.    Review of Systems  Constitutional: no fever or chills  ENT: no nasal congestion or sore throat  Respiratory: no cough or shortness of breath  Cardiovascular: no chest pain or palpitations  Gastrointestinal: no nausea or vomiting, tolerating diet  Genitourinary: as per HPI  Hematologic/Lymphatic: no easy bruising or lymphadenopathy  Musculoskeletal: no arthralgias or myalgias  Neurological: no seizures or tremors  Behavioral/Psych: no auditory or visual hallucinations     Objective:   Vitals: /69 (BP Location: Right arm, Patient Position: Sitting, BP Method: Large (Automatic))   Pulse 82   Ht 5' 11" (1.803 m)   Wt 115.4 kg (254 lb 4.8 oz)   SpO2 96%   BMI 35.47 kg/m²     Physical Exam   General: alert and oriented, no acute distress  Head: normocephalic, atraumatic  Neck: supple, no lymphadenopathy, normal ROM, no masses  Respiratory: Symmetric expansion, non-labored breathing  Cardiovascular: regular rate and rhythm, nomal pulses, no peripheral edema  Abdomen: soft, non tender, non distended, no palpable masses, no hernias, no hepatomegaly or splenomegaly  Genitourinary:   Penis: normal glans and meatus; visible piercing defect on ventral glans; well healed frenulum  Scrotum: no rashes or skin changes;   Neuro: alert and oriented x3, no gross deficits  Psych: " normal judgment and insight, normal mood/affect, and non-anxious    Lab Review     Lab Results   Component Value Date    WBC 8.50 05/05/2016    HGB 17.3 05/05/2016    HCT 50.9 05/05/2016    MCV 98 05/05/2016     (L) 05/05/2016     Lab Results   Component Value Date    CREATININE 0.9 05/05/2016    BUN 25 (H) 05/05/2016       Assessment:     1. Injury of penile urethra        Plan:   Reassured injury appears well healed and change in sensation is normal after such injury  No specific contra-indication to repeat piercing - may aid w/ healing if it diverts urine away from ventral opening  FU PRN

## 2023-07-24 ENCOUNTER — PATIENT MESSAGE (OUTPATIENT)
Dept: ADMINISTRATIVE | Facility: OTHER | Age: 53
End: 2023-07-24
Payer: MEDICAID

## 2024-01-20 ENCOUNTER — PATIENT MESSAGE (OUTPATIENT)
Dept: ADMINISTRATIVE | Facility: OTHER | Age: 54
End: 2024-01-20
Payer: MEDICAID

## 2024-08-07 ENCOUNTER — PATIENT MESSAGE (OUTPATIENT)
Dept: ADMINISTRATIVE | Facility: OTHER | Age: 54
End: 2024-08-07
Payer: MEDICAID

## 2024-08-28 DIAGNOSIS — M25.561 RIGHT KNEE PAIN: Primary | ICD-10-CM

## 2025-05-01 ENCOUNTER — PATIENT MESSAGE (OUTPATIENT)
Dept: ADMINISTRATIVE | Facility: OTHER | Age: 55
End: 2025-05-01
Payer: MEDICAID

## 2025-07-11 ENCOUNTER — PATIENT MESSAGE (OUTPATIENT)
Dept: ADMINISTRATIVE | Facility: OTHER | Age: 55
End: 2025-07-11
Payer: MEDICAID